# Patient Record
Sex: FEMALE | Race: BLACK OR AFRICAN AMERICAN | NOT HISPANIC OR LATINO | Employment: FULL TIME | URBAN - METROPOLITAN AREA
[De-identification: names, ages, dates, MRNs, and addresses within clinical notes are randomized per-mention and may not be internally consistent; named-entity substitution may affect disease eponyms.]

---
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
## 2019-01-24 ENCOUNTER — HISTORICAL (OUTPATIENT)
Dept: LAB | Facility: HOSPITAL | Age: 19
End: 2019-01-24

## 2019-01-26 LAB — FINAL CULTURE: NORMAL

## 2019-01-29 ENCOUNTER — HISTORICAL (OUTPATIENT)
Dept: LAB | Facility: HOSPITAL | Age: 19
End: 2019-01-29

## 2019-01-29 LAB
ABS NEUT (OLG): 6.69 X10(3)/MCL (ref 2.1–9.2)
BASOPHILS # BLD AUTO: 0 X10(3)/MCL (ref 0–0.2)
BASOPHILS NFR BLD AUTO: 0 %
EOSINOPHIL # BLD AUTO: 0.1 X10(3)/MCL (ref 0–0.9)
EOSINOPHIL NFR BLD AUTO: 1 %
ERYTHROCYTE [DISTWIDTH] IN BLOOD BY AUTOMATED COUNT: 13.1 % (ref 11.5–17)
HCT VFR BLD AUTO: 39.4 % (ref 37–47)
HGB BLD-MCNC: 13.1 GM/DL (ref 12–16)
HIV 1+2 AB+HIV1 P24 AG SERPL QL IA: NEGATIVE
IMM GRANULOCYTES # BLD AUTO: 0.05 % (ref 0–0.02)
IMM GRANULOCYTES NFR BLD AUTO: 0.6 % (ref 0–0.43)
LYMPHOCYTES # BLD AUTO: 1.5 X10(3)/MCL (ref 0.6–4.6)
LYMPHOCYTES NFR BLD AUTO: 17 %
MCH RBC QN AUTO: 30 PG (ref 27–31)
MCHC RBC AUTO-ENTMCNC: 33.2 GM/DL (ref 33–36)
MCV RBC AUTO: 90.2 FL (ref 80–94)
MONOCYTES # BLD AUTO: 0.6 X10(3)/MCL (ref 0.1–1.3)
MONOCYTES NFR BLD AUTO: 7 %
NEUTROPHILS # BLD AUTO: 6.69 X10(3)/MCL (ref 1.4–7.9)
NEUTROPHILS NFR BLD AUTO: 75 %
PLATELET # BLD AUTO: 59 X10(3)/MCL (ref 130–400)
PMV BLD AUTO: ABNORMAL FL (ref 9.4–12.4)
RBC # BLD AUTO: 4.37 X10(6)/MCL (ref 4.2–5.4)
T PALLIDUM AB SER QL: NORMAL
WBC # SPEC AUTO: 8.9 X10(3)/MCL (ref 4.5–11.5)

## 2019-03-14 LAB — POC BETA-HCG (QUAL): NEGATIVE

## 2019-04-02 LAB — POC BETA-HCG (QUAL): NEGATIVE

## 2019-05-01 LAB — POC BETA-HCG (QUAL): NEGATIVE

## 2019-11-07 LAB — POC BETA-HCG (QUAL): NEGATIVE

## 2019-11-21 ENCOUNTER — HISTORICAL (OUTPATIENT)
Dept: RADIOLOGY | Facility: HOSPITAL | Age: 19
End: 2019-11-21

## 2019-12-17 LAB — POC BETA-HCG (QUAL): NEGATIVE

## 2020-01-22 ENCOUNTER — HISTORICAL (OUTPATIENT)
Dept: RADIOLOGY | Facility: HOSPITAL | Age: 20
End: 2020-01-22

## 2020-09-02 LAB — POC BETA-HCG (QUAL): POSITIVE

## 2020-09-09 ENCOUNTER — HISTORICAL (OUTPATIENT)
Dept: LAB | Facility: HOSPITAL | Age: 20
End: 2020-09-09

## 2020-09-09 LAB
ABS NEUT (OLG): 5.83 X10(3)/MCL (ref 2.1–9.2)
AMPHET UR QL SCN: NEGATIVE
BARBITURATE SCN PRESENT UR: NEGATIVE
BASOPHILS # BLD AUTO: 0 X10(3)/MCL (ref 0–0.2)
BASOPHILS NFR BLD AUTO: 1 %
BENZODIAZ UR QL SCN: NEGATIVE
CANNABINOIDS UR QL SCN: NEGATIVE
COCAINE UR QL SCN: NEGATIVE
EOSINOPHIL # BLD AUTO: 0.1 X10(3)/MCL (ref 0–0.9)
EOSINOPHIL NFR BLD AUTO: 1 %
ERYTHROCYTE [DISTWIDTH] IN BLOOD BY AUTOMATED COUNT: 12.9 % (ref 11.5–17)
GROUP & RH: NORMAL
HBV SURFACE AG SERPL QL IA: NONREACTIVE
HCT VFR BLD AUTO: 38.6 % (ref 37–47)
HCV AB SERPL QL IA: NONREACTIVE
HGB BLD-MCNC: 12.8 GM/DL (ref 12–16)
HIV 1+2 AB+HIV1 P24 AG SERPL QL IA: NONREACTIVE
IMM GRANULOCYTES # BLD AUTO: 0.01 % (ref 0–0.02)
IMM GRANULOCYTES NFR BLD AUTO: 0.1 % (ref 0–0.43)
LYMPHOCYTES # BLD AUTO: 2.1 X10(3)/MCL (ref 0.6–4.6)
LYMPHOCYTES NFR BLD AUTO: 24 %
MCH RBC QN AUTO: 29.9 PG (ref 27–31)
MCHC RBC AUTO-ENTMCNC: 33.2 GM/DL (ref 33–36)
MCV RBC AUTO: 90.2 FL (ref 80–94)
MONOCYTES # BLD AUTO: 0.5 X10(3)/MCL (ref 0.1–1.3)
MONOCYTES NFR BLD AUTO: 6 %
NEUTROPHILS # BLD AUTO: 5.83 X10(3)/MCL (ref 1.4–7.9)
NEUTROPHILS NFR BLD AUTO: 68 %
OPIATES UR QL SCN: NEGATIVE
PCP UR QL: NEGATIVE
PH UR STRIP.AUTO: 8.5 [PH] (ref 5–7.5)
PLATELET # BLD AUTO: 133 X10(3)/MCL (ref 130–400)
PMV BLD AUTO: 14.2 FL (ref 9.4–12.4)
RBC # BLD AUTO: 4.28 X10(6)/MCL (ref 4.2–5.4)
SP GR FLD REFRACTOMETRY: 1.01 (ref 1–1.03)
T PALLIDUM AB SER QL: NONREACTIVE
T4 FREE SERPL-MCNC: 0.96 NG/DL (ref 0.76–1.46)
TSH SERPL-ACNC: 0.43 MIU/ML (ref 0.36–3.74)
WBC # SPEC AUTO: 8.6 X10(3)/MCL (ref 4.5–11.5)

## 2021-01-20 ENCOUNTER — HISTORICAL (OUTPATIENT)
Dept: LAB | Facility: HOSPITAL | Age: 21
End: 2021-01-20

## 2021-01-20 LAB — GLUCOSE 1H P 100 G GLC PO SERPL-MCNC: 99 MG/DL (ref 100–180)

## 2021-03-25 ENCOUNTER — HISTORICAL (OUTPATIENT)
Dept: LAB | Facility: HOSPITAL | Age: 21
End: 2021-03-25

## 2021-03-25 LAB
ABS NEUT (OLG): 7.07 X10(3)/MCL (ref 2.1–9.2)
ANISOCYTOSIS BLD QL SMEAR: SLIGHT
BASOPHILS # BLD AUTO: 0.1 X10(3)/MCL (ref 0–0.2)
BASOPHILS NFR BLD AUTO: 1 %
EOSINOPHIL # BLD AUTO: 0.1 X10(3)/MCL (ref 0–0.9)
EOSINOPHIL NFR BLD AUTO: 1 %
ERYTHROCYTE [DISTWIDTH] IN BLOOD BY AUTOMATED COUNT: 14.5 % (ref 11.5–17)
HCT VFR BLD AUTO: 39.4 % (ref 37–47)
HGB BLD-MCNC: 12.8 GM/DL (ref 12–16)
HIV 1+2 AB+HIV1 P24 AG SERPL QL IA: NONREACTIVE
IMM GRANULOCYTES # BLD AUTO: 0.11 % (ref 0–0.02)
IMM GRANULOCYTES NFR BLD AUTO: 1.1 % (ref 0–0.43)
LYMPHOCYTES # BLD AUTO: 1.8 X10(3)/MCL (ref 0.6–4.6)
LYMPHOCYTES NFR BLD AUTO: 18 %
MCH RBC QN AUTO: 29.7 PG (ref 27–31)
MCHC RBC AUTO-ENTMCNC: 32.5 GM/DL (ref 33–36)
MCV RBC AUTO: 91.4 FL (ref 80–94)
MONOCYTES # BLD AUTO: 0.9 X10(3)/MCL (ref 0.1–1.3)
MONOCYTES NFR BLD AUTO: 9 %
NEUTROPHILS # BLD AUTO: 7.07 X10(3)/MCL (ref 1.4–7.9)
NEUTROPHILS NFR BLD AUTO: 70 %
PLATELET # BLD AUTO: 90 X10(3)/MCL (ref 130–400)
PLATELET # BLD EST: NORMAL 10*3/UL
PMV BLD AUTO: 13.7 FL (ref 9.4–12.4)
RBC # BLD AUTO: 4.31 X10(6)/MCL (ref 4.2–5.4)
RBC MORPH BLD: NORMAL
T PALLIDUM AB SER QL: NONREACTIVE
WBC # SPEC AUTO: 10 X10(3)/MCL (ref 4.5–11.5)

## 2021-04-28 LAB — POC BETA-HCG (QUAL): NEGATIVE

## 2021-06-09 LAB — POC BETA-HCG (QUAL): NEGATIVE

## 2021-07-07 LAB — POC BETA-HCG (QUAL): NEGATIVE

## 2022-04-11 ENCOUNTER — HISTORICAL (OUTPATIENT)
Dept: ADMINISTRATIVE | Facility: HOSPITAL | Age: 22
End: 2022-04-11

## 2022-04-28 VITALS
SYSTOLIC BLOOD PRESSURE: 112 MMHG | DIASTOLIC BLOOD PRESSURE: 64 MMHG | BODY MASS INDEX: 35.44 KG/M2 | WEIGHT: 200 LBS | HEIGHT: 63 IN

## 2022-05-04 NOTE — HISTORICAL [facility] CERNER
This is a historical note converted from Naomy. Formatting and pictures may have been removed.  Please reference Naomy for original formatting and attached multimedia. Chief Complaint  referral from [facility] for miscarriage  History of Present Illness  17yo  presents for f/u s/p suction D&C on 3/21 @ [facility]. Pt reports pain well controlled, tolerating regular diet, ambulating and voiding without difficulty, but still has pregnancy symptoms: back aches, nausea, fatigue, urinary frequency. Her bleeding stopped 2 days after procedure at which point she had unprotected intercourse and has lost count of the number of times shes had sex since then.?Only other c/o constipation with last BM before D&C.  Review of Systems  Constitutional: ?Negative, No fever, No chills, No fatigue, No night sweats. ?  Head/Neck: Normocephalic, Atraumatic  Ear/Nose/Mouth/Throat: ?Negative, No nasal congestion, No sore throat. ?  Respiratory: ?Negative, No shortness of breath, No cough, No wheezing. ?  Cardiovascular: ?Negative, No chest pain. ?  Gastrointestinal:?See above.  Genitourinary:??See above. ?  Musculoskeletal: ?Negative, No joint pain, No muscle pain. ?  Integumentary: ?Negative, No rash. ?  Neurologic: ?Negative. ?  Psychiatric: ?Negative.?  Physical Exam  Vitals & Measurements  T:?36.7? ?C (Oral)? RR:?20?  HT:?162.56?cm? HT:?162.56?cm? WT:?69.5?kg? WT:?69.5?kg? BMI:?26.3?  ?  PHYSICAL EXAM  General: NAD, A/Ox3  Neck: supple  Respiratory: nonlabored respirations  Cardiovascular: regular rate  Abdomen: soft, obese, nondistended, nontender to palpation, no hepatosplenomegaly, no masses palpated  Extremities: no edema, no calf tenderness  Breast: defer  External genitalia: normal female anatomy, no masses/lesions. Normal appearing urethral meatus. Normal appearing external anus.  Sterile speculum exam: vaginal mucosa normal in appearance. Pink. No masses/lesions. Cervix well visualized, smooth in contour no  masses or lesions. Os normal in appearance, no blood or discharge coming from the os.  Bimanual exam: vaginal with?adequate capacity. Uterus 6cm in size, no cervical motion tenderness.?Moderate descent, mobile, not broad. No adnexal fullness/tenderness. Suprapubic tenderness. Normal urethra. Normal bladder.  ?  Assessment/Plan  1.?S/P D&C (status post dilation and curettage)  -Doing well postop. Precautions given  ?  Screen for STD (sexually transmitted disease)  -GC/CT, trich negative  -Decline serology  ?  Unprotected sex  -UPT negative. Counselled on contraceptive options including OCPs, vaginal ring,?patch, DepoProvera, Nexplanon, IUD, condoms. Pt verbalizes understanding of her options, and does not want to decide at this time. Encouraged condom use in the meantime  Ordered:  Urine Pregnancy POC, 04/02/18 10:30:00 CDT, [facility]  Urine Pregnancy POC, 04/02/18 10:59:00 CDT, [facility]  ?  Urine frequency  -UA/UCx, tx as indicated  Ordered:  Urine Culture [id number], Routine collect, 04/02/18 11:00:00 CDT, Urine, Clean Catch, Nurse collect, Stop date 04/02/18 11:00:00 CDT, Urine frequency  ?  Constipation  -stool softeners and Miralax prn  ?  Discussed with Dr. Breen.?RTC?1yr or prn if decides wants to initiate contraception  ?   Problem List/Past Medical History  Ongoing  Miscarriage  Historical  No qualifying data  Procedure/Surgical History  D&C - Dilatation and curettage (2018).  Medications  Template Non-Formulary Med, 1 capsule, Oral, Daily  Allergies  No Known Allergies  Social History  Alcohol  Never, 04/02/2018  Employment/School  Employed, Student, 04/02/2018  Exercise  Home/Environment  Lives with Mother. Living situation: Home/Independent., 04/02/2018  Nutrition/Health  Regular, 04/02/2018  Sexual  Sexually active: Yes., 04/02/2018  Substance Abuse  Never, 04/02/2018  Tobacco  Never smoker, 04/02/2018  Family History  Seizure: Brother.      Reviewed the patients medical history, residents  findings on physical exam, and the diagnosis with treatment plan. Care provided was reasonable and necessary.

## 2022-09-21 ENCOUNTER — HISTORICAL (OUTPATIENT)
Dept: ADMINISTRATIVE | Facility: HOSPITAL | Age: 22
End: 2022-09-21

## 2023-04-04 ENCOUNTER — LAB VISIT (OUTPATIENT)
Dept: LAB | Facility: HOSPITAL | Age: 23
End: 2023-04-04
Attending: OBSTETRICS & GYNECOLOGY
Payer: MEDICAID

## 2023-04-04 DIAGNOSIS — Z34.90 PREGNANCY, UNSPECIFIED GESTATIONAL AGE: Primary | ICD-10-CM

## 2023-04-04 LAB
B-HCG FREE SERPL-ACNC: 688.5 MIU/ML
ERYTHROCYTE [DISTWIDTH] IN BLOOD BY AUTOMATED COUNT: 13.1 % (ref 11.5–17)
GROUP & RH: NORMAL
HCT VFR BLD AUTO: 37.9 % (ref 37–47)
HGB BLD-MCNC: 12.2 G/DL (ref 12–16)
INDIRECT COOMBS GEL: NORMAL
MCH RBC QN AUTO: 29.4 PG (ref 27–31)
MCHC RBC AUTO-ENTMCNC: 32.2 G/DL (ref 33–36)
MCV RBC AUTO: 91.3 FL (ref 80–94)
PLATELET # BLD AUTO: 169 X10(3)/MCL (ref 130–400)
PMV BLD AUTO: 13.5 FL (ref 7.4–10.4)
RBC # BLD AUTO: 4.15 X10(6)/MCL (ref 4.2–5.4)
SPECIMEN OUTDATE: NORMAL
T4 FREE SERPL-MCNC: 0.9 NG/DL (ref 0.7–1.48)
TSH SERPL-ACNC: 1.26 UIU/ML (ref 0.35–4.94)
WBC # SPEC AUTO: 9 X10(3)/MCL (ref 4.5–11.5)

## 2023-04-04 PROCEDURE — 84702 CHORIONIC GONADOTROPIN TEST: CPT

## 2023-04-04 PROCEDURE — 85027 COMPLETE CBC AUTOMATED: CPT

## 2023-04-04 PROCEDURE — 84443 ASSAY THYROID STIM HORMONE: CPT

## 2023-04-04 PROCEDURE — 86900 BLOOD TYPING SEROLOGIC ABO: CPT | Performed by: OBSTETRICS & GYNECOLOGY

## 2023-04-04 PROCEDURE — 84439 ASSAY OF FREE THYROXINE: CPT

## 2023-04-04 PROCEDURE — 36415 COLL VENOUS BLD VENIPUNCTURE: CPT

## 2023-04-06 ENCOUNTER — APPOINTMENT (OUTPATIENT)
Dept: LAB | Facility: HOSPITAL | Age: 23
End: 2023-04-06
Attending: OBSTETRICS & GYNECOLOGY
Payer: MEDICAID

## 2023-04-11 ENCOUNTER — LAB VISIT (OUTPATIENT)
Dept: LAB | Facility: HOSPITAL | Age: 23
End: 2023-04-11
Attending: OBSTETRICS & GYNECOLOGY
Payer: MEDICAID

## 2023-04-11 DIAGNOSIS — N91.2 AMENORRHEA: ICD-10-CM

## 2023-04-11 LAB — B-HCG FREE SERPL-ACNC: 9470.73 MIU/ML

## 2023-04-11 PROCEDURE — 84702 CHORIONIC GONADOTROPIN TEST: CPT

## 2023-04-11 PROCEDURE — 36415 COLL VENOUS BLD VENIPUNCTURE: CPT

## 2023-04-13 ENCOUNTER — LAB VISIT (OUTPATIENT)
Dept: LAB | Facility: HOSPITAL | Age: 23
End: 2023-04-13
Attending: OBSTETRICS & GYNECOLOGY
Payer: MEDICAID

## 2023-04-13 DIAGNOSIS — N91.2 AMENORRHEA: ICD-10-CM

## 2023-04-13 LAB — B-HCG FREE SERPL-ACNC: ABNORMAL MIU/ML

## 2023-04-13 PROCEDURE — 84702 CHORIONIC GONADOTROPIN TEST: CPT

## 2023-04-13 PROCEDURE — 36415 COLL VENOUS BLD VENIPUNCTURE: CPT

## 2023-04-14 ENCOUNTER — HOSPITAL ENCOUNTER (OUTPATIENT)
Dept: RADIOLOGY | Facility: HOSPITAL | Age: 23
Discharge: HOME | End: 2023-04-14
Attending: OBSTETRICS & GYNECOLOGY
Payer: MEDICAID

## 2023-04-14 DIAGNOSIS — Z32.01 POSITIVE URINE PREGNANCY TEST: ICD-10-CM

## 2023-04-14 DIAGNOSIS — N91.2 AMENORRHEA: ICD-10-CM

## 2023-04-14 PROCEDURE — 76817 TRANSVAGINAL US OBSTETRIC: CPT | Mod: TC

## 2023-05-04 ENCOUNTER — LAB VISIT (OUTPATIENT)
Dept: LAB | Facility: HOSPITAL | Age: 23
End: 2023-05-04
Payer: MEDICAID

## 2023-05-04 DIAGNOSIS — Z00.00 ROUTINE GENERAL MEDICAL EXAMINATION AT A HEALTH CARE FACILITY: Primary | ICD-10-CM

## 2023-05-04 DIAGNOSIS — Z13.21 SCREENING FOR MALNUTRITION: ICD-10-CM

## 2023-05-04 LAB
ALBUMIN SERPL-MCNC: 3.7 G/DL (ref 3.5–5)
ALBUMIN/GLOB SERPL: 1 RATIO (ref 1.1–2)
ALP SERPL-CCNC: 42 UNIT/L (ref 40–150)
ALT SERPL-CCNC: 12 UNIT/L (ref 0–55)
AST SERPL-CCNC: 13 UNIT/L (ref 5–34)
BILIRUBIN DIRECT+TOT PNL SERPL-MCNC: 0.4 MG/DL
BUN SERPL-MCNC: 6.1 MG/DL (ref 7–18.7)
CALCIUM SERPL-MCNC: 8.8 MG/DL (ref 8.4–10.2)
CHLORIDE SERPL-SCNC: 105 MMOL/L (ref 98–107)
CHOLEST SERPL-MCNC: 177 MG/DL
CHOLEST/HDLC SERPL: 3 {RATIO} (ref 0–5)
CO2 SERPL-SCNC: 24 MMOL/L (ref 22–29)
CREAT SERPL-MCNC: 0.57 MG/DL (ref 0.55–1.02)
DEPRECATED CALCIDIOL+CALCIFEROL SERPL-MC: 21.7 NG/ML (ref 30–80)
GFR SERPLBLD CREATININE-BSD FMLA CKD-EPI: >60 MLS/MIN/1.73/M2
GLOBULIN SER-MCNC: 3.6 GM/DL (ref 2.4–3.5)
GLUCOSE SERPL-MCNC: 92 MG/DL (ref 74–100)
HDLC SERPL-MCNC: 63 MG/DL (ref 35–60)
LDLC SERPL CALC-MCNC: 94 MG/DL (ref 50–140)
POTASSIUM SERPL-SCNC: 3.5 MMOL/L (ref 3.5–5.1)
PROT SERPL-MCNC: 7.3 GM/DL (ref 6.4–8.3)
SODIUM SERPL-SCNC: 137 MMOL/L (ref 136–145)
TRIGL SERPL-MCNC: 98 MG/DL (ref 37–140)
VLDLC SERPL CALC-MCNC: 20 MG/DL

## 2023-05-04 PROCEDURE — 82306 VITAMIN D 25 HYDROXY: CPT

## 2023-05-04 PROCEDURE — 80053 COMPREHEN METABOLIC PANEL: CPT

## 2023-05-04 PROCEDURE — 80061 LIPID PANEL: CPT

## 2023-05-04 PROCEDURE — 36415 COLL VENOUS BLD VENIPUNCTURE: CPT

## 2023-05-31 ENCOUNTER — LAB VISIT (OUTPATIENT)
Dept: LAB | Facility: HOSPITAL | Age: 23
End: 2023-05-31
Attending: OBSTETRICS & GYNECOLOGY
Payer: MEDICAID

## 2023-05-31 DIAGNOSIS — Z34.81 ENCOUNTER FOR SUPERVISION OF NORMAL PREGNANCY IN MULTIGRAVIDA IN FIRST TRIMESTER: ICD-10-CM

## 2023-05-31 DIAGNOSIS — Z03.89 ENCNTR FOR OBS FOR OTH SUSPECTED DISEASES AND COND RULED OUT: ICD-10-CM

## 2023-05-31 LAB
BASOPHILS # BLD AUTO: 0.04 X10(3)/MCL
BASOPHILS NFR BLD AUTO: 0.5 %
EOSINOPHIL # BLD AUTO: 0.11 X10(3)/MCL (ref 0–0.9)
EOSINOPHIL NFR BLD AUTO: 1.3 %
ERYTHROCYTE [DISTWIDTH] IN BLOOD BY AUTOMATED COUNT: 12.4 % (ref 11.5–17)
GROUP & RH: NORMAL
HBV SURFACE AG SERPL QL IA: NONREACTIVE
HCT VFR BLD AUTO: 39.9 % (ref 37–47)
HCV AB SERPL QL IA: NONREACTIVE
HGB BLD-MCNC: 12.7 G/DL (ref 12–16)
HIV 1+2 AB+HIV1 P24 AG SERPL QL IA: NONREACTIVE
IMM GRANULOCYTES # BLD AUTO: 0.03 X10(3)/MCL (ref 0–0.04)
IMM GRANULOCYTES NFR BLD AUTO: 0.3 %
INDIRECT COOMBS GEL: NORMAL
LYMPHOCYTES # BLD AUTO: 2.5 X10(3)/MCL (ref 0.6–4.6)
LYMPHOCYTES NFR BLD AUTO: 29 %
MCH RBC QN AUTO: 28.4 PG (ref 27–31)
MCHC RBC AUTO-ENTMCNC: 31.8 G/DL (ref 33–36)
MCV RBC AUTO: 89.3 FL (ref 80–94)
MONOCYTES # BLD AUTO: 0.57 X10(3)/MCL (ref 0.1–1.3)
MONOCYTES NFR BLD AUTO: 6.6 %
NEUTROPHILS # BLD AUTO: 5.36 X10(3)/MCL (ref 2.1–9.2)
NEUTROPHILS NFR BLD AUTO: 62.3 %
PLATELET # BLD AUTO: 132 X10(3)/MCL (ref 130–400)
PMV BLD AUTO: 13.6 FL (ref 7.4–10.4)
RBC # BLD AUTO: 4.47 X10(6)/MCL (ref 4.2–5.4)
SPECIMEN OUTDATE: NORMAL
T PALLIDUM AB SER QL: NONREACTIVE
T4 FREE SERPL-MCNC: 0.81 NG/DL (ref 0.7–1.48)
TSH SERPL-ACNC: 1.16 UIU/ML (ref 0.35–4.94)
WBC # SPEC AUTO: 8.61 X10(3)/MCL (ref 4.5–11.5)

## 2023-05-31 PROCEDURE — 87340 HEPATITIS B SURFACE AG IA: CPT

## 2023-05-31 PROCEDURE — 36415 COLL VENOUS BLD VENIPUNCTURE: CPT

## 2023-05-31 PROCEDURE — 87389 HIV-1 AG W/HIV-1&-2 AB AG IA: CPT

## 2023-05-31 PROCEDURE — 85025 COMPLETE CBC W/AUTO DIFF WBC: CPT

## 2023-05-31 PROCEDURE — 83020 HEMOGLOBIN ELECTROPHORESIS: CPT

## 2023-05-31 PROCEDURE — 84443 ASSAY THYROID STIM HORMONE: CPT

## 2023-05-31 PROCEDURE — 86762 RUBELLA ANTIBODY: CPT

## 2023-05-31 PROCEDURE — 86780 TREPONEMA PALLIDUM: CPT

## 2023-05-31 PROCEDURE — 86787 VARICELLA-ZOSTER ANTIBODY: CPT

## 2023-05-31 PROCEDURE — 86803 HEPATITIS C AB TEST: CPT

## 2023-05-31 PROCEDURE — 86900 BLOOD TYPING SEROLOGIC ABO: CPT | Performed by: OBSTETRICS & GYNECOLOGY

## 2023-05-31 PROCEDURE — 84439 ASSAY OF FREE THYROXINE: CPT

## 2023-06-01 LAB
HGB A MFR BLD ELPH: 97.6 % (ref 95.8–98)
HGB A2 MFR BLD ELPH: 2.4 % (ref 2–3.3)
HGB F MFR BLD ELPH: 0 % (ref 0–0.9)
HGB FRACT BLD ELPH-IMP: NORMAL
M HPLC HB VARIANT, B: NORMAL
RUBV IGG SERPL IA-ACNC: 2.3
RUBV IGG SERPL QL IA: POSITIVE
VZV IGG SER IA-ACNC: 5.1
VZV IGG SER QL IA: POSITIVE

## 2023-06-13 ENCOUNTER — PATIENT MESSAGE (OUTPATIENT)
Dept: RESEARCH | Facility: HOSPITAL | Age: 23
End: 2023-06-13
Payer: MEDICAID

## 2023-06-23 ENCOUNTER — HOSPITAL ENCOUNTER (EMERGENCY)
Facility: HOSPITAL | Age: 23
Discharge: HOME | End: 2023-06-23
Attending: EMERGENCY MEDICINE
Payer: MEDICAID

## 2023-06-23 VITALS
BODY MASS INDEX: 35.68 KG/M2 | WEIGHT: 209 LBS | HEIGHT: 64 IN | OXYGEN SATURATION: 99 % | RESPIRATION RATE: 18 BRPM | DIASTOLIC BLOOD PRESSURE: 81 MMHG | SYSTOLIC BLOOD PRESSURE: 138 MMHG | HEART RATE: 101 BPM | TEMPERATURE: 98 F

## 2023-06-23 DIAGNOSIS — R52 PAIN: ICD-10-CM

## 2023-06-23 DIAGNOSIS — N39.0 URINARY TRACT INFECTION WITHOUT HEMATURIA, SITE UNSPECIFIED: Primary | ICD-10-CM

## 2023-06-23 LAB
APPEARANCE UR: CLEAR
B-HCG SERPL QL: POSITIVE
BACTERIA #/AREA URNS AUTO: ABNORMAL /HPF
BILIRUB UR QL STRIP.AUTO: NEGATIVE MG/DL
COLOR UR: YELLOW
GLUCOSE UR QL STRIP.AUTO: NEGATIVE MG/DL
KETONES UR QL STRIP.AUTO: 15 MG/DL
LEUKOCYTE ESTERASE UR QL STRIP.AUTO: ABNORMAL UNIT/L
NITRITE UR QL STRIP.AUTO: NEGATIVE
PH UR STRIP.AUTO: 6.5 [PH]
PROT UR QL STRIP.AUTO: 30 MG/DL
RBC #/AREA URNS AUTO: ABNORMAL /HPF
RBC UR QL AUTO: NEGATIVE UNIT/L
SP GR UR STRIP.AUTO: >=1.03
SQUAMOUS #/AREA URNS AUTO: ABNORMAL /HPF
UROBILINOGEN UR STRIP-ACNC: 1 MG/DL
WBC #/AREA URNS AUTO: ABNORMAL /HPF

## 2023-06-23 PROCEDURE — 81025 URINE PREGNANCY TEST: CPT | Performed by: EMERGENCY MEDICINE

## 2023-06-23 PROCEDURE — 81001 URINALYSIS AUTO W/SCOPE: CPT | Performed by: EMERGENCY MEDICINE

## 2023-06-23 PROCEDURE — 99284 EMERGENCY DEPT VISIT MOD MDM: CPT | Mod: 25

## 2023-06-23 RX ORDER — NITROFURANTOIN (MACROCRYSTALS) 100 MG/1
100 CAPSULE ORAL EVERY 12 HOURS
Qty: 20 CAPSULE | Refills: 0 | Status: SHIPPED | OUTPATIENT
Start: 2023-06-23 | End: 2023-07-03

## 2023-06-23 NOTE — Clinical Note
"Zuleika Asifdelia Morales was seen and treated in our emergency department on 6/23/2023.  She may return to work on 06/25/2023.       If you have any questions or concerns, please don't hesitate to call.      Jared Quiroga MD"

## 2023-06-23 NOTE — DISCHARGE INSTRUCTIONS
Patient will be discharged home instructed to take antibiotic as prescribed and finish.  Follow up with OBGYN next week.  Return to ER for any worsening symptoms

## 2023-06-23 NOTE — ED PROVIDER NOTES
Encounter Date: 6/23/2023       History     Chief Complaint   Patient presents with    Abdominal Pain     Pt c/o pelvic pressure x 1 week; reports she is 17 weeks pregnant. Pt denies any vaginal bleeding. Fetal heart tones 149 in triage.      Seventeen week pregnant female with lower abdominal cramping    The history is provided by the patient. No  was used.   Review of patient's allergies indicates:  No Known Allergies  No past medical history on file.  Past Surgical History:   Procedure Laterality Date    DILATION AND CURETTAGE OF UTERUS       Family History   Problem Relation Age of Onset    Seizures Brother      Social History     Tobacco Use    Smoking status: Never    Smokeless tobacco: Never     Review of Systems   Constitutional: Negative.    HENT: Negative.     Eyes: Negative.    Respiratory: Negative.     Cardiovascular: Negative.    Gastrointestinal: Negative.    Endocrine: Negative.    Genitourinary:  Positive for pelvic pain.   Musculoskeletal: Negative.    Skin: Negative.    Allergic/Immunologic: Negative.    Neurological: Negative.    Hematological: Negative.    Psychiatric/Behavioral: Negative.     All other systems reviewed and are negative.    Physical Exam     Initial Vitals [06/23/23 1619]   BP Pulse Resp Temp SpO2   138/81 101 18 98.1 °F (36.7 °C) 99 %      MAP       --         Physical Exam    Nursing note and vitals reviewed.  Constitutional: She appears well-developed and well-nourished.   HENT:   Head: Normocephalic and atraumatic.   Right Ear: External ear normal.   Left Ear: External ear normal.   Nose: Nose normal.   Mouth/Throat: Oropharynx is clear and moist.   Eyes: Conjunctivae and EOM are normal. Pupils are equal, round, and reactive to light.   Neck: Neck supple.   Normal range of motion.  Cardiovascular:  Normal rate, regular rhythm, normal heart sounds and intact distal pulses.           Pulmonary/Chest: Breath sounds normal.   Abdominal: Abdomen is soft. Bowel  sounds are normal.   Genitourinary:    Genitourinary Comments: Pelvic pain     Musculoskeletal:         General: Normal range of motion.      Cervical back: Normal range of motion and neck supple.     Neurological: She is alert and oriented to person, place, and time. She has normal strength and normal reflexes. GCS score is 15. GCS eye subscore is 4. GCS verbal subscore is 5. GCS motor subscore is 6.   Skin: Skin is warm and dry. Capillary refill takes less than 2 seconds.   Psychiatric: She has a normal mood and affect. Her behavior is normal. Judgment and thought content normal.       ED Course   Procedures  Labs Reviewed   URINALYSIS, REFLEX TO URINE CULTURE - Abnormal; Notable for the following components:       Result Value    Protein, UA 30 (*)     Ketones, UA 15 (*)     Leukocyte Esterase, UA Small (*)     All other components within normal limits   PREGNANCY TEST, URINE RAPID - Abnormal; Notable for the following components:    Beta hCG Qualitative, Urine Positive (*)     All other components within normal limits   URINALYSIS, MICROSCOPIC - Abnormal; Notable for the following components:    Bacteria, UA Moderate (*)     Squamous Epithelial Cells, UA Many (*)     All other components within normal limits          Imaging Results              US OB More Than 14 Wks First Gestation (In process)                      Medications - No data to display  Medical Decision Making:   Initial Assessment:   Awake alert oriented no distress 23 year female presents to emergency room complaints of lower pelvic pain.  Patient reports she is 17 weeks pregnant.  Vital signs stable afebrile.  Skin cool moist.  Bilateral breath sounds clear to auscultation respirations even unlabored heart regular rate and rhythm.  Abdomen is soft nontender no rigidity.  No CVAT.  Patient denies vaginal bleeding.  CMT-.  Patient denies abdominal cramping or discharge.  States more of a pressure to lower abdominal area negative flank pain.  All  other review of systems within normal limits.  Differential Diagnosis:   UTI  Ectopic    Clinical Tests:   Lab Tests: Ordered  Radiological Study: Ordered  ED Management:  U/S:  Normal  Labs:  +WBC , Leukocytes                         Clinical Impression:   Final diagnoses:  [R52] Pain  [N39.0] Urinary tract infection without hematuria, site unspecified (Primary)        ED Disposition Condition    Discharge Stable          ED Prescriptions       Medication Sig Dispense Start Date End Date Auth. Provider    nitrofurantoin (MACRODANTIN) 100 MG capsule Take 1 capsule (100 mg total) by mouth every 12 (twelve) hours. for 10 days 20 capsule 6/23/2023 7/3/2023 Kendal River NP          Follow-up Information       Follow up With Specialties Details Why Contact Info    Deana Hoyt NP Nurse Practitioner   [address] [place] [address]  [phone]               Kendal River NP  06/23/23 [id number]

## 2023-07-19 ENCOUNTER — PATIENT MESSAGE (OUTPATIENT)
Dept: RESEARCH | Facility: HOSPITAL | Age: 23
End: 2023-07-19
Payer: MEDICAID

## 2023-07-24 ENCOUNTER — PATIENT MESSAGE (OUTPATIENT)
Dept: RESEARCH | Facility: HOSPITAL | Age: 23
End: 2023-07-24
Payer: MEDICAID

## 2023-09-05 ENCOUNTER — LAB VISIT (OUTPATIENT)
Dept: LAB | Facility: HOSPITAL | Age: 23
End: 2023-09-05
Attending: OBSTETRICS & GYNECOLOGY
Payer: MEDICAID

## 2023-09-05 DIAGNOSIS — Z34.82 ENCOUNTER FOR SUPERVISION OF NORMAL PREGNANCY IN MULTIGRAVIDA IN SECOND TRIMESTER: ICD-10-CM

## 2023-09-05 DIAGNOSIS — Z34.82 PRENATAL CARE, SUBSEQUENT PREGNANCY, SECOND TRIMESTER: ICD-10-CM

## 2023-09-05 LAB
BASOPHILS # BLD AUTO: 0.03 X10(3)/MCL
BASOPHILS NFR BLD AUTO: 0.3 %
EOSINOPHIL # BLD AUTO: 0.07 X10(3)/MCL (ref 0–0.9)
EOSINOPHIL NFR BLD AUTO: 0.8 %
ERYTHROCYTE [DISTWIDTH] IN BLOOD BY AUTOMATED COUNT: 14.6 % (ref 11.5–17)
GLUCOSE 1H P 100 G GLC PO SERPL-MCNC: 108 MG/DL (ref 100–180)
HCT VFR BLD AUTO: 35.7 % (ref 37–47)
HGB BLD-MCNC: 11.4 G/DL (ref 12–16)
IMM GRANULOCYTES # BLD AUTO: 0.11 X10(3)/MCL (ref 0–0.04)
IMM GRANULOCYTES NFR BLD AUTO: 1.3 %
LYMPHOCYTES # BLD AUTO: 2.18 X10(3)/MCL (ref 0.6–4.6)
LYMPHOCYTES NFR BLD AUTO: 25.4 %
MCH RBC QN AUTO: 28.6 PG (ref 27–31)
MCHC RBC AUTO-ENTMCNC: 31.9 G/DL (ref 33–36)
MCV RBC AUTO: 89.5 FL (ref 80–94)
MONOCYTES # BLD AUTO: 0.55 X10(3)/MCL (ref 0.1–1.3)
MONOCYTES NFR BLD AUTO: 6.4 %
NEUTROPHILS # BLD AUTO: 5.65 X10(3)/MCL (ref 2.1–9.2)
NEUTROPHILS NFR BLD AUTO: 65.8 %
PLATELET # BLD AUTO: 103 X10(3)/MCL (ref 130–400)
PLATELET # BLD EST: ABNORMAL 10*3/UL
PMV BLD AUTO: ABNORMAL FL
RBC # BLD AUTO: 3.99 X10(6)/MCL (ref 4.2–5.4)
WBC # SPEC AUTO: 8.59 X10(3)/MCL (ref 4.5–11.5)

## 2023-09-05 PROCEDURE — 85025 COMPLETE CBC W/AUTO DIFF WBC: CPT

## 2023-09-05 PROCEDURE — 36415 COLL VENOUS BLD VENIPUNCTURE: CPT

## 2023-09-05 PROCEDURE — 82950 GLUCOSE TEST: CPT

## 2023-09-06 DIAGNOSIS — D69.6 THROMBOCYTOPENIA: Primary | ICD-10-CM

## 2023-09-11 ENCOUNTER — LAB VISIT (OUTPATIENT)
Dept: LAB | Facility: HOSPITAL | Age: 23
End: 2023-09-11
Attending: OBSTETRICS & GYNECOLOGY
Payer: MEDICAID

## 2023-09-11 DIAGNOSIS — D69.6 THROMBOCYTOPENIA: ICD-10-CM

## 2023-09-11 LAB
BASOPHILS # BLD AUTO: 0.06 X10(3)/MCL
BASOPHILS NFR BLD AUTO: 0.6 %
EOSINOPHIL # BLD AUTO: 0.1 X10(3)/MCL (ref 0–0.9)
EOSINOPHIL NFR BLD AUTO: 1.1 %
ERYTHROCYTE [DISTWIDTH] IN BLOOD BY AUTOMATED COUNT: 14.3 % (ref 11.5–17)
HCT VFR BLD AUTO: 36.8 % (ref 37–47)
HGB BLD-MCNC: 11.7 G/DL (ref 12–16)
IMM GRANULOCYTES # BLD AUTO: 0.17 X10(3)/MCL (ref 0–0.04)
IMM GRANULOCYTES NFR BLD AUTO: 1.8 %
LYMPHOCYTES # BLD AUTO: 2.48 X10(3)/MCL (ref 0.6–4.6)
LYMPHOCYTES NFR BLD AUTO: 26.3 %
MCH RBC QN AUTO: 28.7 PG (ref 27–31)
MCHC RBC AUTO-ENTMCNC: 31.8 G/DL (ref 33–36)
MCV RBC AUTO: 90.2 FL (ref 80–94)
MONOCYTES # BLD AUTO: 0.7 X10(3)/MCL (ref 0.1–1.3)
MONOCYTES NFR BLD AUTO: 7.4 %
NEUTROPHILS # BLD AUTO: 5.91 X10(3)/MCL (ref 2.1–9.2)
NEUTROPHILS NFR BLD AUTO: 62.8 %
PLATELET # BLD AUTO: 102 X10(3)/MCL (ref 130–400)
PMV BLD AUTO: 13.3 FL (ref 7.4–10.4)
RBC # BLD AUTO: 4.08 X10(6)/MCL (ref 4.2–5.4)
WBC # SPEC AUTO: 9.42 X10(3)/MCL (ref 4.5–11.5)

## 2023-09-11 PROCEDURE — 36415 COLL VENOUS BLD VENIPUNCTURE: CPT

## 2023-09-11 PROCEDURE — 85025 COMPLETE CBC W/AUTO DIFF WBC: CPT

## 2023-09-25 ENCOUNTER — OFFICE VISIT (OUTPATIENT)
Dept: MATERNAL FETAL MEDICINE | Facility: CLINIC | Age: 23
End: 2023-09-25
Payer: MEDICAID

## 2023-09-25 ENCOUNTER — PROCEDURE VISIT (OUTPATIENT)
Dept: MATERNAL FETAL MEDICINE | Facility: CLINIC | Age: 23
End: 2023-09-25
Payer: MEDICAID

## 2023-09-25 VITALS
HEART RATE: 96 BPM | SYSTOLIC BLOOD PRESSURE: 112 MMHG | BODY MASS INDEX: 37.22 KG/M2 | WEIGHT: 218 LBS | HEIGHT: 64 IN | DIASTOLIC BLOOD PRESSURE: 80 MMHG

## 2023-09-25 DIAGNOSIS — D69.6 BENIGN GESTATIONAL THROMBOCYTOPENIA IN THIRD TRIMESTER: ICD-10-CM

## 2023-09-25 DIAGNOSIS — E66.01 MORBID OBESITY: ICD-10-CM

## 2023-09-25 DIAGNOSIS — O99.113 BENIGN GESTATIONAL THROMBOCYTOPENIA IN THIRD TRIMESTER: ICD-10-CM

## 2023-09-25 DIAGNOSIS — D69.6 THROMBOCYTOPENIA: ICD-10-CM

## 2023-09-25 DIAGNOSIS — O99.213 OBESITY AFFECTING PREGNANCY IN THIRD TRIMESTER: ICD-10-CM

## 2023-09-25 DIAGNOSIS — Z34.82 PRENATAL CARE, SUBSEQUENT PREGNANCY, SECOND TRIMESTER: ICD-10-CM

## 2023-09-25 PROCEDURE — 3008F PR BODY MASS INDEX (BMI) DOCUMENTED: ICD-10-PCS | Mod: CPTII,S$GLB,, | Performed by: OBSTETRICS & GYNECOLOGY

## 2023-09-25 PROCEDURE — 3074F PR MOST RECENT SYSTOLIC BLOOD PRESSURE < 130 MM HG: ICD-10-PCS | Mod: CPTII,S$GLB,, | Performed by: OBSTETRICS & GYNECOLOGY

## 2023-09-25 PROCEDURE — 1159F PR MEDICATION LIST DOCUMENTED IN MEDICAL RECORD: ICD-10-PCS | Mod: CPTII,S$GLB,, | Performed by: OBSTETRICS & GYNECOLOGY

## 2023-09-25 PROCEDURE — 3008F BODY MASS INDEX DOCD: CPT | Mod: CPTII,S$GLB,, | Performed by: OBSTETRICS & GYNECOLOGY

## 2023-09-25 PROCEDURE — 3074F SYST BP LT 130 MM HG: CPT | Mod: CPTII,S$GLB,, | Performed by: OBSTETRICS & GYNECOLOGY

## 2023-09-25 PROCEDURE — 76811 PR US, OB FETAL EVAL & EXAM, TRANSABDOM,FIRST GESTATION: ICD-10-PCS | Mod: S$GLB,,, | Performed by: OBSTETRICS & GYNECOLOGY

## 2023-09-25 PROCEDURE — 76811 OB US DETAILED SNGL FETUS: CPT | Mod: S$GLB,,, | Performed by: OBSTETRICS & GYNECOLOGY

## 2023-09-25 PROCEDURE — 99203 OFFICE O/P NEW LOW 30 MIN: CPT | Mod: TH,25,S$GLB, | Performed by: OBSTETRICS & GYNECOLOGY

## 2023-09-25 PROCEDURE — 3079F DIAST BP 80-89 MM HG: CPT | Mod: CPTII,S$GLB,, | Performed by: OBSTETRICS & GYNECOLOGY

## 2023-09-25 PROCEDURE — 3079F PR MOST RECENT DIASTOLIC BLOOD PRESSURE 80-89 MM HG: ICD-10-PCS | Mod: CPTII,S$GLB,, | Performed by: OBSTETRICS & GYNECOLOGY

## 2023-09-25 PROCEDURE — 1159F MED LIST DOCD IN RCRD: CPT | Mod: CPTII,S$GLB,, | Performed by: OBSTETRICS & GYNECOLOGY

## 2023-09-25 PROCEDURE — 99203 PR OFFICE/OUTPT VISIT, NEW, LEVL III, 30-44 MIN: ICD-10-PCS | Mod: TH,25,S$GLB, | Performed by: OBSTETRICS & GYNECOLOGY

## 2023-09-25 NOTE — PROGRESS NOTES
Maternal Fetal Medicine New Consult    Subjective     Patient ID: [id number]    Chief Complaint: MFM Consult with US (Thrombocytopenia.  )      HPI 23 y.o.  female  at 29w2d gestation with Estimated Date of Delivery: 23 by early US, not consistent with LMP. She is sent for MFM consultation for thrombocytopenia.  On 2023, platelets 102K.  Earlier platelet was 132 on May 31st 2023. Increased BMI of 37 at consult visit.  Patient has not taken any medication during pregnancy other than prenatal vitamins and Phenergan earlier in pregnancy for nausea vomiting.  She is still using Phenergan p.r.n. about once a week.  She denies any personal or family history of aneuploidy or anomalies. She denies any exposure to high fevers, viral rashes, illicit drugs or alcohol in this pregnancy.  She denies any leaking fluid, vaginal bleeding, contractions, decreased fetal movement. Denies headaches, visual disturbances, or epigastric pain.  Patient is accompanied by Owen Lerner, partner and father with the baby    Pregnancy complications include:   Patient Active Problem List   Diagnosis    Benign gestational thrombocytopenia in third trimester    Increased BMI affecting pregnancy in third trimester        Past Medical History:   Diagnosis Date    Thrombocytopenia, unspecified 2023       Past Surgical History:   Procedure Laterality Date    DILATION AND CURETTAGE OF UTERUS  2018       Family History   Problem Relation Age of Onset    Miscarriages / Stillbirths Maternal Grandmother     Hypertension Maternal Grandmother     Diabetes Maternal Grandmother     Hypertension Maternal Grandfather     Diabetes Maternal Grandfather     Seizures Brother        Social History     Socioeconomic History    Marital status:    Tobacco Use    Smoking status: Never    Smokeless tobacco: Never   Substance and Sexual Activity    Alcohol use: Not Currently    Drug use: Never    Sexual activity: Yes     Partners: Male  "      Current Outpatient Medications   Medication Sig Dispense Refill    prenatal vit/iron fum/folic ac (PRENATAL 1+1 ORAL) Take by mouth.      promethazine (PHENERGAN) 12.5 MG Tab Take 1 tablet (12.5 mg total) by mouth 2 (two) times a day. 60 tablet 1     No current facility-administered medications for this visit.       Review of patient's allergies indicates:  No Known Allergies    Medications:  Current Outpatient Medications   Medication Instructions    prenatal vit/iron fum/folic ac (PRENATAL 1+1 ORAL) Oral    promethazine (PHENERGAN) 12.5 mg, Oral, 2 times daily       Review of Systems   12 point review of systems conducted, negative except as stated in the history of present illness. See HPI for details.      Objective     Visit Vitals  /80 (BP Location: Left arm, Patient Position: Sitting, BP Method: Large (Automatic))   Pulse 96   Ht 5' 4" (1.626 m)   Wt 98.9 kg (218 lb)   LMP 01/01/2023   BMI 37.42 kg/m²        Physical Exam  Vitals and nursing note reviewed. Exam conducted with a chaperone present.   Constitutional:       General: She is not in acute distress.     Appearance: Normal appearance.      Comments: Increased BMI   HENT:      Head: Normocephalic and atraumatic.      Nose: Nose normal. No congestion.      Mouth/Throat:      Pharynx: Oropharynx is clear.   Eyes:      General: No scleral icterus.     Pupils: Pupils are equal, round, and reactive to light.   Cardiovascular:      Rate and Rhythm: Normal rate and regular rhythm.   Pulmonary:      Effort: Pulmonary effort is normal.   Abdominal:      Palpations: Abdomen is soft.      Tenderness: There is no abdominal tenderness. There is no right CVA tenderness, left CVA tenderness or guarding.      Comments: No CVA tenderness gravid uterus.    Musculoskeletal:         General: Normal range of motion.      Cervical back: Neck supple.      Right lower leg: No edema.      Left lower leg: No edema.   Skin:     General: Skin is warm.      Findings: " No bruising or rash.   Neurological:      General: No focal deficit present.      Mental Status: She is oriented to person, place, and time.      Deep Tendon Reflexes: Reflexes normal.      Comments: Normal reflexes   Psychiatric:         Mood and Affect: Mood normal.         Behavior: Behavior normal.         Thought Content: Thought content normal.         Judgment: Judgment normal.         ASSESSMENT/PLAN:     23 y.o.  female with IUP at 29w2d    Gestational thrombocytopenia, 3rd trimester    There is normal fetal growth with an EFW of 1571 g at the 56% and the AC at the 74% on 2023,  with no anomalies seen.  Face views are not possible because of fetal position in a prone position with head in maternal pelvis.  Thrombocytopenia is defined at platelet count of less than 150k. Frequently disorders in platelet function or numbers lead to bleeding in mucosal membranes. With new diagnosis of thrombocytopenia, if drugs and other medical conditions are excluded, the most likely diagnosis in the 1sttrimester is ITP, and in 2nd trimester will be Gestational thrombocytopenia.     I discussed with her that this is a benign condition that affects approximately 10 % of pregnancies, at term, most commonly a result of decrease platelet production. Most commonly see platelet count 100-150 k. About 10% at term have Platelet count less than 150 K. Only 1% have platelet counts less than 100k. Only 0.1% have platelet counts less than 80K. If platelet counts is less than 100k, a cause other than pregnancy or its complications should be considered. Gestational thrombocytopenia can recur in subsequent pregnancies. Women with this are typically asymptomatic and it does not post any significant maternal/fetal risks. However, some may report mild petechiae, ecchymoses, epistaxis, gingival bleeding.     With no higher risk for  morbidity, no treatment is recommended with need to monitor platelet count every 4 weeks  till 36 weeks, then weekly and expectant management to be done. It is expected to see normalization of platelet count 2-12 weeks (mean 7 weeks) post delivery with no sequela.  The platelet count should be repeat 1-3 month pp, to determine if resolution of thrombocytopenia has occurred.  thrombocytopenia occur in 0.1%-2.3%.     If need major surgery and low platelets, it is recommended that platelet transfusion is given to increase platelets to over 50k.     Epidural or spinal anesthesia is considered acceptable and the risk of epidural hematoma is exceptionally low with platelet counts of 70 K or more, provided that the platelet level is stable, there is no other acquired or congenital coagulopathy, the platelet count is normal, and the patient is not on any antiplatelet or anticoagulant therapy.        I gave a test for a CBC to be done around mid October and follow-up CBC could be done with Dr. Stephens.  With fetal anatomy scan incomplete, we will check 1 more time in 4 weeks to try to complete anatomy scan.  Increased BMI in pregnancy    Body mass index is 37.42 kg/m².    I discussed the risk of miscarriage in first trimester, recurrent miscarriages, congenital anomalies, hypertension, diabetes,  labor and the higher risk of  section and the higher risk of fetal demise in-utero. There is also higher risk of for excessive fetal weight and large for gestational age (LGA) fetuses. Mothers with LGA fetuses are at higher risk of prolonged labor,  delivery, shoulder dystocia and birth trauma. LGA neonates are increased risk of fetal hypoxia and intrauterine death, and are at risk to develop diabetes, obesity, metabolic syndrome, asthma and cancer later in life. She was advised of the importance of eating healthy and limiting weight gain to 11-20 lbs during the pregnancy, as optimal in this situation. I recommended low calorie, low fat diet avoiding any additional excessive weight gain.  Excess weight gain would be associated with gestational hypertension, gestational diabetes and adverse  outcomes, including fetal demise in utero.    It is important to do FKC from 24 weeks till delivery.       Importance of working on losing weight after the pregnancy is over, especially before a future pregnancy was discussed. Breastfeeding may be an important tool in reducing the postpartum weight retention. Fetal risks were discussed with short term risk of fetal/ obesity and long term risk of adolescent component of metabolic syndrome.    Follow a healthy low caloric diet.     No follow-ups on file.     Future Appointments   Date Time Provider Department Center   10/2/2023  8:45 AM Kareem Stephens MD [facility]        Patient was evaluated and examined by Dr. Prescott. RIMA Ellis, helped in pre charting of part of note.    Components of this note were documented using voice recognition systems; and are subject to errors not corrected at proof reading.  Please contact the author for any clarifications.

## 2023-10-11 ENCOUNTER — LAB VISIT (OUTPATIENT)
Dept: LAB | Facility: HOSPITAL | Age: 23
End: 2023-10-11
Attending: OBSTETRICS & GYNECOLOGY
Payer: MEDICAID

## 2023-10-11 DIAGNOSIS — D69.6 THROMBOCYTOPENIA: ICD-10-CM

## 2023-10-11 LAB
ERYTHROCYTE [DISTWIDTH] IN BLOOD BY AUTOMATED COUNT: 14.2 % (ref 11.5–17)
HCT VFR BLD AUTO: 37.6 % (ref 37–47)
HGB BLD-MCNC: 11.7 G/DL (ref 12–16)
MCH RBC QN AUTO: 27.9 PG (ref 27–31)
MCHC RBC AUTO-ENTMCNC: 31.1 G/DL (ref 33–36)
MCV RBC AUTO: 89.5 FL (ref 80–94)
PLATELET # BLD AUTO: 113 X10(3)/MCL (ref 130–400)
PMV BLD AUTO: ABNORMAL FL
RBC # BLD AUTO: 4.2 X10(6)/MCL (ref 4.2–5.4)
WBC # SPEC AUTO: 9.51 X10(3)/MCL (ref 4.5–11.5)

## 2023-10-11 PROCEDURE — 85027 COMPLETE CBC AUTOMATED: CPT

## 2023-10-11 PROCEDURE — 36415 COLL VENOUS BLD VENIPUNCTURE: CPT

## 2023-10-17 DIAGNOSIS — O99.213 OBESITY AFFECTING PREGNANCY IN THIRD TRIMESTER: Primary | ICD-10-CM

## 2023-10-23 ENCOUNTER — LAB VISIT (OUTPATIENT)
Dept: LAB | Facility: HOSPITAL | Age: 23
End: 2023-10-23
Attending: OBSTETRICS & GYNECOLOGY
Payer: MEDICAID

## 2023-10-23 ENCOUNTER — TELEPHONE (OUTPATIENT)
Dept: MATERNAL FETAL MEDICINE | Facility: CLINIC | Age: 23
End: 2023-10-23

## 2023-10-23 ENCOUNTER — OFFICE VISIT (OUTPATIENT)
Dept: MATERNAL FETAL MEDICINE | Facility: CLINIC | Age: 23
End: 2023-10-23
Payer: MEDICAID

## 2023-10-23 ENCOUNTER — PROCEDURE VISIT (OUTPATIENT)
Dept: MATERNAL FETAL MEDICINE | Facility: CLINIC | Age: 23
End: 2023-10-23
Payer: MEDICAID

## 2023-10-23 VITALS
BODY MASS INDEX: 37.73 KG/M2 | HEIGHT: 64 IN | DIASTOLIC BLOOD PRESSURE: 90 MMHG | HEART RATE: 105 BPM | SYSTOLIC BLOOD PRESSURE: 121 MMHG | WEIGHT: 221 LBS

## 2023-10-23 DIAGNOSIS — E66.01 MORBID OBESITY: ICD-10-CM

## 2023-10-23 DIAGNOSIS — O99.213 OBESITY AFFECTING PREGNANCY IN THIRD TRIMESTER: ICD-10-CM

## 2023-10-23 DIAGNOSIS — D69.6 BENIGN GESTATIONAL THROMBOCYTOPENIA IN THIRD TRIMESTER: Primary | ICD-10-CM

## 2023-10-23 DIAGNOSIS — D69.6 BENIGN GESTATIONAL THROMBOCYTOPENIA IN THIRD TRIMESTER: ICD-10-CM

## 2023-10-23 DIAGNOSIS — O99.213 OBESITY AFFECTING PREGNANCY IN THIRD TRIMESTER, UNSPECIFIED OBESITY TYPE: ICD-10-CM

## 2023-10-23 DIAGNOSIS — O16.3 ELEVATED BLOOD PRESSURE AFFECTING PREGNANCY IN THIRD TRIMESTER, ANTEPARTUM: ICD-10-CM

## 2023-10-23 DIAGNOSIS — O99.113 BENIGN GESTATIONAL THROMBOCYTOPENIA IN THIRD TRIMESTER: Primary | ICD-10-CM

## 2023-10-23 DIAGNOSIS — O21.0 HYPEREMESIS GRAVIDARUM, ANTEPARTUM: ICD-10-CM

## 2023-10-23 DIAGNOSIS — O99.113 BENIGN GESTATIONAL THROMBOCYTOPENIA IN THIRD TRIMESTER: ICD-10-CM

## 2023-10-23 LAB
ALT SERPL-CCNC: 10 UNIT/L (ref 0–55)
AST SERPL-CCNC: 14 UNIT/L (ref 5–34)
CREAT SERPL-MCNC: 0.58 MG/DL (ref 0.55–1.02)
ERYTHROCYTE [DISTWIDTH] IN BLOOD BY AUTOMATED COUNT: 14.6 % (ref 11.5–17)
GFR SERPLBLD CREATININE-BSD FMLA CKD-EPI: >60 MLS/MIN/1.73/M2
HCT VFR BLD AUTO: 37 % (ref 37–47)
HGB BLD-MCNC: 12 G/DL (ref 12–16)
LDH SERPL-CCNC: 163 U/L (ref 125–220)
MCH RBC QN AUTO: 28.5 PG (ref 27–31)
MCHC RBC AUTO-ENTMCNC: 32.4 G/DL (ref 33–36)
MCV RBC AUTO: 87.9 FL (ref 80–94)
NRBC BLD AUTO-RTO: 0 %
PLATELET # BLD AUTO: 111 X10(3)/MCL (ref 130–400)
PMV BLD AUTO: ABNORMAL FL
RBC # BLD AUTO: 4.21 X10(6)/MCL (ref 4.2–5.4)
URATE SERPL-MCNC: 2.7 MG/DL (ref 2.6–6)
WBC # SPEC AUTO: 9.93 X10(3)/MCL (ref 4.5–11.5)

## 2023-10-23 PROCEDURE — 76816 OB US FOLLOW-UP PER FETUS: CPT | Mod: S$GLB,,, | Performed by: OBSTETRICS & GYNECOLOGY

## 2023-10-23 PROCEDURE — 3008F BODY MASS INDEX DOCD: CPT | Mod: CPTII,S$GLB,, | Performed by: OBSTETRICS & GYNECOLOGY

## 2023-10-23 PROCEDURE — 99214 PR OFFICE/OUTPT VISIT, EST, LEVL IV, 30-39 MIN: ICD-10-PCS | Mod: TH,25,S$GLB, | Performed by: OBSTETRICS & GYNECOLOGY

## 2023-10-23 PROCEDURE — 36415 COLL VENOUS BLD VENIPUNCTURE: CPT

## 2023-10-23 PROCEDURE — 84460 ALANINE AMINO (ALT) (SGPT): CPT

## 2023-10-23 PROCEDURE — 3008F PR BODY MASS INDEX (BMI) DOCUMENTED: ICD-10-PCS | Mod: CPTII,S$GLB,, | Performed by: OBSTETRICS & GYNECOLOGY

## 2023-10-23 PROCEDURE — 82565 ASSAY OF CREATININE: CPT

## 2023-10-23 PROCEDURE — 1159F PR MEDICATION LIST DOCUMENTED IN MEDICAL RECORD: ICD-10-PCS | Mod: CPTII,S$GLB,, | Performed by: OBSTETRICS & GYNECOLOGY

## 2023-10-23 PROCEDURE — 84450 TRANSFERASE (AST) (SGOT): CPT

## 2023-10-23 PROCEDURE — 99214 OFFICE O/P EST MOD 30 MIN: CPT | Mod: TH,25,S$GLB, | Performed by: OBSTETRICS & GYNECOLOGY

## 2023-10-23 PROCEDURE — 3074F SYST BP LT 130 MM HG: CPT | Mod: CPTII,S$GLB,, | Performed by: OBSTETRICS & GYNECOLOGY

## 2023-10-23 PROCEDURE — 76819 PR US, OB, FETAL BIOPHYSICAL, W/O NST: ICD-10-PCS | Mod: S$GLB,,, | Performed by: OBSTETRICS & GYNECOLOGY

## 2023-10-23 PROCEDURE — 3080F DIAST BP >= 90 MM HG: CPT | Mod: CPTII,S$GLB,, | Performed by: OBSTETRICS & GYNECOLOGY

## 2023-10-23 PROCEDURE — 3080F PR MOST RECENT DIASTOLIC BLOOD PRESSURE >= 90 MM HG: ICD-10-PCS | Mod: CPTII,S$GLB,, | Performed by: OBSTETRICS & GYNECOLOGY

## 2023-10-23 PROCEDURE — 1159F MED LIST DOCD IN RCRD: CPT | Mod: CPTII,S$GLB,, | Performed by: OBSTETRICS & GYNECOLOGY

## 2023-10-23 PROCEDURE — 3074F PR MOST RECENT SYSTOLIC BLOOD PRESSURE < 130 MM HG: ICD-10-PCS | Mod: CPTII,S$GLB,, | Performed by: OBSTETRICS & GYNECOLOGY

## 2023-10-23 PROCEDURE — 76816 PR  US,PREGNANT UTERUS,F/U,TRANSABD APP: ICD-10-PCS | Mod: S$GLB,,, | Performed by: OBSTETRICS & GYNECOLOGY

## 2023-10-23 PROCEDURE — 84550 ASSAY OF BLOOD/URIC ACID: CPT

## 2023-10-23 PROCEDURE — 76819 FETAL BIOPHYS PROFIL W/O NST: CPT | Mod: S$GLB,,, | Performed by: OBSTETRICS & GYNECOLOGY

## 2023-10-23 PROCEDURE — 85027 COMPLETE CBC AUTOMATED: CPT

## 2023-10-23 PROCEDURE — 83615 LACTATE (LD) (LDH) ENZYME: CPT

## 2023-10-23 RX ORDER — CEPHALEXIN 250 MG/5ML
250 POWDER, FOR SUSPENSION ORAL EVERY 6 HOURS
COMMUNITY

## 2023-10-23 NOTE — PROGRESS NOTES
Maternal Fetal Medicine Follow Up Consult    Subjective     Patient ID: [id number]    Chief Complaint: MFM follow up with US (Thrombocytopenia. Patient is having headaches, irregular contractions and pelvic pressure.)      HPI: Zuleika Morales is a 23 y.o. female  at 33w2d gestation with Estimated Date of Delivery: 23  who is here for follow  up consultation by MFM.  She has gestational thrombocytopenia.  In May 2023, platelets were 132K. On 2023, platelets 102K. On 10/11/2023, platelets 113K. Increased BMI of 37 at consult visit.  Patient has not taken any medication during pregnancy other than prenatal vitamins and Phenergan earlier in pregnancy for nausea vomiting.           Interval history since last MFM visit:  Intermittent headaches for the last 4 days throbbing with reported history of past migraines.. She denies any leaking fluid, vaginal bleeding, contractions, decreased fetal movement. Denies headaches, visual disturbances, or epigastric pain.    Pregnancy complications include:   Patient Active Problem List   Diagnosis    Benign gestational thrombocytopenia in third trimester    Increased BMI affecting pregnancy in third trimester    BMI at 37 at the initial MFM consult    Hyperemesis gravidarum, antepartum    Elevated blood pressure affecting pregnancy in third trimester, antepartum        No changes to medical, surgical, family, social, or obstetric history.    Medications:  Current Outpatient Medications   Medication Instructions    cephALEXin (KEFLEX) 250 mg, Oral, Every 6 hours, 5 mg daily    prenatal vit/iron fum/folic ac (PRENATAL 1+1 ORAL) Oral    promethazine (PHENERGAN) 12.5 mg, Oral, 2 times daily       Review of Systems   12 point review of systems conducted, negative except as stated in the history of present illness. See HPI for details.      Objective     Visit Vitals  BP (!) 121/90 (BP Location: Left arm, Patient Position: Sitting, BP Method: Large (Automatic))   Pulse 105  "  Ht 5' 4" (1.626 m)   Wt 100.2 kg (221 lb)   LMP 2023   BMI 37.93 kg/m²        Physical Exam  Vitals and nursing note reviewed.   Constitutional:       Appearance: Normal appearance.      Comments: Increased BMI   HENT:      Head: Normocephalic and atraumatic.      Nose: Nose normal. No congestion.   Cardiovascular:      Rate and Rhythm: Normal rate.   Pulmonary:      Effort: Pulmonary effort is normal.   Musculoskeletal:      Comments: Trace edema bilaterally with normal reflexes bilaterally   Skin:     Findings: No rash.   Neurological:      Mental Status: She is alert and oriented to person, place, and time.      Deep Tendon Reflexes: Reflexes normal.   Psychiatric:         Mood and Affect: Mood normal.         Behavior: Behavior normal.         Thought Content: Thought content normal.         Judgment: Judgment normal.           ASSESSMENT/PLAN:     23 y.o.  female with IUP at 33w2d    Gestational thrombocytopenia  There is normal fetal growth with an EFW of 2255 g at the 32% and the AC at the 88% on 10/23/2023.  AFV is normal.     With no higher risk for  morbidity, no treatment is recommended with need to monitor platelet count every 4 weeks till 36 weeks, then weekly and expectant management to be done. It is expected to see normalization of platelet count 2-12 weeks (mean 7 weeks) post delivery with no sequela.  The platelet count should be repeat 1-3 month pp, to determine if resolution of thrombocytopenia has occurred.  thrombocytopenia occur in 0.1%-2.3%.     If need major surgery and low platelets, it is recommended that platelet transfusion is given to increase platelets to over 50k.     Epidural or spinal anesthesia is considered acceptable and the risk of epidural hematoma is exceptionally low with platelet counts of 70 K or more, provided that the platelet level is stable, there is no other acquired or congenital coagulopathy, the platelet count is normal, and the " patient is not on any antiplatelet or anticoagulant therapy.      Increased BMI in pregnancy  Body mass index is 37.93 kg/m². With stable weight from last visit, she was advised to continue healthy and low caloric diet.  Excess weight gain would be associated with gestational hypertension, gestational diabetes and adverse  outcomes, including fetal demise in utero.    With risk factors associated with increased BMI, continue fetal kick counts till delivery.    It is important to lose weight after the pregnancy is over, especially before a future pregnancy was discussed. Breastfeeding may be an important tool in reducing the postpartum weight retention. Fetal risks were discussed with short term risk of fetal/ obesity and long term risk of adolescent component of metabolic syndrome.    Elevated blood pressure today.  Order preeclampsia labs.  If blood pressure is elevated again in the future confirming gestational hypertension that we would like to start seeing her every week with Dr. Stephens.  We will plan to see her again this Thursday to recheck the blood pressure and follow-up on preeclampsia labs that were ordered.    Follow up in about 3 days (around 10/26/2023) for MFM follow-up, BPP.     Future Appointments   Date Time Provider Department Center   10/30/2023  9:00 AM Kareem Stephens MD [facility]        [facility] involvement: Patient was evaluated and examined by Dr. Prescott. RIMA Ellis, helped in pre charting of part of note.      Patient was here to rechecked fetal anatomy scan.  Ductal arch is still not seen.  Limitations of ultrasound discussed.  There is normal fetal growth.  Reassuring fetal testing done because of BMI over 35.  Recommend repeating a platelet count around  and then weekly in the last month of the pregnancy.  We will be happy to see her again if the platelet count get significantly lower than 100,000. We would BMI over 35 consider weekly NSTs  somewhere around 34-36 weeks.  Fetal kick count instructions.  We will check preeclampsia labs in a few days and check blood pressure again.  If gestational hypertension confirmed and twice weekly fetal testing and delivery at 37 weeks unless severe features are present that would require earlier delivery.  Components of this note were documented using voice recognition systems; and are subject to errors not corrected at proof reading.  Please contact the author for any clarifications.

## 2023-10-23 NOTE — PROGRESS NOTES
Platelets 111K, stable from last assessment.  Preeclampsia labs reassuring with , uric acid 2.7, creatinine 0.58, AST 14, ALT 10.  We will discuss at next visit.

## 2023-10-23 NOTE — TELEPHONE ENCOUNTER
----- Message from Sruthi Guzman sent at 10/23/2023  1:57 PM CDT -----  Regarding: Results  Patient is requesting a call back asap at [phone]. She wants to know about her results from the lab work she did today, and if she needs to go to the hospital to be admitted.     Called Pt back to inform her that her labs came out fine per Dr. Prescott and she does not need to be admitted.

## 2023-10-25 DIAGNOSIS — O21.0 HYPEREMESIS GRAVIDARUM, ANTEPARTUM: ICD-10-CM

## 2023-10-25 DIAGNOSIS — E66.01 MORBID OBESITY: ICD-10-CM

## 2023-10-25 DIAGNOSIS — O99.213 OBESITY AFFECTING PREGNANCY IN THIRD TRIMESTER, UNSPECIFIED OBESITY TYPE: ICD-10-CM

## 2023-10-25 DIAGNOSIS — O99.113 BENIGN GESTATIONAL THROMBOCYTOPENIA IN THIRD TRIMESTER: Primary | ICD-10-CM

## 2023-10-25 DIAGNOSIS — D69.6 BENIGN GESTATIONAL THROMBOCYTOPENIA IN THIRD TRIMESTER: Primary | ICD-10-CM

## 2023-10-26 ENCOUNTER — OFFICE VISIT (OUTPATIENT)
Dept: MATERNAL FETAL MEDICINE | Facility: CLINIC | Age: 23
End: 2023-10-26
Payer: MEDICAID

## 2023-10-26 ENCOUNTER — PROCEDURE VISIT (OUTPATIENT)
Dept: MATERNAL FETAL MEDICINE | Facility: CLINIC | Age: 23
End: 2023-10-26
Payer: MEDICAID

## 2023-10-26 VITALS
HEIGHT: 64 IN | HEART RATE: 96 BPM | BODY MASS INDEX: 37.9 KG/M2 | DIASTOLIC BLOOD PRESSURE: 72 MMHG | SYSTOLIC BLOOD PRESSURE: 101 MMHG | WEIGHT: 222 LBS

## 2023-10-26 DIAGNOSIS — O16.3 ELEVATED BLOOD PRESSURE AFFECTING PREGNANCY IN THIRD TRIMESTER, ANTEPARTUM: Primary | ICD-10-CM

## 2023-10-26 DIAGNOSIS — O21.0 HYPEREMESIS GRAVIDARUM, ANTEPARTUM: ICD-10-CM

## 2023-10-26 DIAGNOSIS — O99.213 OBESITY AFFECTING PREGNANCY IN THIRD TRIMESTER, UNSPECIFIED OBESITY TYPE: ICD-10-CM

## 2023-10-26 DIAGNOSIS — E66.01 MORBID OBESITY: ICD-10-CM

## 2023-10-26 DIAGNOSIS — D69.6 BENIGN GESTATIONAL THROMBOCYTOPENIA IN THIRD TRIMESTER: ICD-10-CM

## 2023-10-26 DIAGNOSIS — O99.113 BENIGN GESTATIONAL THROMBOCYTOPENIA IN THIRD TRIMESTER: ICD-10-CM

## 2023-10-26 DIAGNOSIS — R09.82 POSTNASAL DRIP: ICD-10-CM

## 2023-10-26 PROCEDURE — 3008F BODY MASS INDEX DOCD: CPT | Mod: CPTII,S$GLB,, | Performed by: OBSTETRICS & GYNECOLOGY

## 2023-10-26 PROCEDURE — 3008F PR BODY MASS INDEX (BMI) DOCUMENTED: ICD-10-PCS | Mod: CPTII,S$GLB,, | Performed by: OBSTETRICS & GYNECOLOGY

## 2023-10-26 PROCEDURE — 76819 FETAL BIOPHYS PROFIL W/O NST: CPT | Mod: S$GLB,,, | Performed by: OBSTETRICS & GYNECOLOGY

## 2023-10-26 PROCEDURE — 3078F DIAST BP <80 MM HG: CPT | Mod: CPTII,S$GLB,, | Performed by: OBSTETRICS & GYNECOLOGY

## 2023-10-26 PROCEDURE — 99213 PR OFFICE/OUTPT VISIT, EST, LEVL III, 20-29 MIN: ICD-10-PCS | Mod: TH,S$GLB,, | Performed by: OBSTETRICS & GYNECOLOGY

## 2023-10-26 PROCEDURE — 3078F PR MOST RECENT DIASTOLIC BLOOD PRESSURE < 80 MM HG: ICD-10-PCS | Mod: CPTII,S$GLB,, | Performed by: OBSTETRICS & GYNECOLOGY

## 2023-10-26 PROCEDURE — 99213 OFFICE O/P EST LOW 20 MIN: CPT | Mod: TH,S$GLB,, | Performed by: OBSTETRICS & GYNECOLOGY

## 2023-10-26 PROCEDURE — 1159F MED LIST DOCD IN RCRD: CPT | Mod: CPTII,S$GLB,, | Performed by: OBSTETRICS & GYNECOLOGY

## 2023-10-26 PROCEDURE — 1159F PR MEDICATION LIST DOCUMENTED IN MEDICAL RECORD: ICD-10-PCS | Mod: CPTII,S$GLB,, | Performed by: OBSTETRICS & GYNECOLOGY

## 2023-10-26 PROCEDURE — 76819 PR US, OB, FETAL BIOPHYSICAL, W/O NST: ICD-10-PCS | Mod: S$GLB,,, | Performed by: OBSTETRICS & GYNECOLOGY

## 2023-10-26 PROCEDURE — 3074F PR MOST RECENT SYSTOLIC BLOOD PRESSURE < 130 MM HG: ICD-10-PCS | Mod: CPTII,S$GLB,, | Performed by: OBSTETRICS & GYNECOLOGY

## 2023-10-26 PROCEDURE — 3074F SYST BP LT 130 MM HG: CPT | Mod: CPTII,S$GLB,, | Performed by: OBSTETRICS & GYNECOLOGY

## 2023-10-26 NOTE — PROGRESS NOTES
Maternal Fetal Medicine Follow Up Consult    Subjective     Patient ID: [id number]    Chief Complaint: MFM follow up with US (Patient is having headaches, sinus problems and post nasal drip.)      HPI: Zuleika Morales is a 23 y.o. female  at 33w5d gestation with Estimated Date of Delivery: 23  who is here for follow  up consultation by MFM.  She has gestational thrombocytopenia.  In May 2023, platelets were 132K. On 2023, platelets 102K. On 10/11/2023, platelets 113K. Increased BMI of 37 at consult visit.  Patient has not taken any medication during pregnancy other than prenatal vitamins and Phenergan earlier in pregnancy for nausea and vomiting.  She had mildly elevated blood pressure at last visit, for which preeclampsia labs were ordered and were reassuring. On 10/23/2023, platelets 111K, , uric acid 2.7, creatinine 0.58, AST 14, ALT 10.  She complains of a postnasal drip and sinus headache for the past week.  She denies any fever or any other symptoms.  She has been taking Tylenol as needed with some relief.         Interval history since last MFM visit:  Intermittent headaches for the last 4 days throbbing with reported history of past migraines.. She denies any leaking fluid, vaginal bleeding, contractions, decreased fetal movement. Denies headaches, visual disturbances, or epigastric pain.    Pregnancy complications include:   Patient Active Problem List   Diagnosis    Benign gestational thrombocytopenia in third trimester    Increased BMI affecting pregnancy in third trimester    BMI at 37 at the initial MFM consult    Hyperemesis gravidarum, antepartum    Elevated blood pressure affecting pregnancy in third trimester, antepartum    Postnasal drip        No changes to medical, surgical, family, social, or obstetric history.    Medications:  Current Outpatient Medications   Medication Instructions    cephALEXin (KEFLEX) 250 mg, Oral, Every 6 hours, 5 mg daily    prenatal vit/iron  "fum/folic ac (PRENATAL 1+1 ORAL) Oral    promethazine (PHENERGAN) 12.5 mg, Oral, 2 times daily       Review of Systems   12 point review of systems conducted, negative except as stated in the history of present illness. See HPI for details.      Objective     Visit Vitals  /72 (BP Location: Right arm, Patient Position: Sitting, BP Method: Large (Automatic))   Pulse 96   Ht 5' 4" (1.626 m)   Wt 100.7 kg (222 lb)   LMP 2023   BMI 38.11 kg/m²        Physical Exam  Vitals and nursing note reviewed.   Constitutional:       Appearance: Normal appearance.      Comments: Increased BMI   HENT:      Head: Normocephalic and atraumatic.      Nose: Nose normal. No congestion.   Cardiovascular:      Rate and Rhythm: Normal rate.   Pulmonary:      Effort: Pulmonary effort is normal.   Musculoskeletal:      Comments: Trace edema bilaterally with normal reflexes bilaterally   Skin:     Findings: No rash.   Neurological:      Mental Status: She is alert and oriented to person, place, and time.      Deep Tendon Reflexes: Reflexes normal.   Psychiatric:         Mood and Affect: Mood normal.         Behavior: Behavior normal.         Thought Content: Thought content normal.         Judgment: Judgment normal.           ASSESSMENT/PLAN:     23 y.o.  female with IUP at 33w5d    Gestational thrombocytopenia  There is normal fetal growth with an EFW of 2255 g at the 32% and the AC at the 88% on 10/23/2023.  AFV is normal. BPP 8/8.    With no higher risk for  morbidity, no treatment is recommended with need to monitor platelet count every 4 weeks till 36 weeks, then weekly and expectant management to be done. It is expected to see normalization of platelet count 2-12 weeks (mean 7 weeks) post delivery with no sequela.  The platelet count should be repeat 1-3 month pp, to determine if resolution of thrombocytopenia has occurred.  thrombocytopenia occur in 0.1%-2.3%.     If need major surgery and low " platelets, it is recommended that platelet transfusion is given to increase platelets to over 50k.     Epidural or spinal anesthesia is considered acceptable and the risk of epidural hematoma is exceptionally low with platelet counts of 70 K or more, provided that the platelet level is stable, there is no other acquired or congenital coagulopathy, the platelet count is normal, and the patient is not on any antiplatelet or anticoagulant therapy.      Increased BMI in pregnancy  Body mass index is 38.11 kg/m². With stable weight from last visit, she was advised to continue healthy and low caloric diet.  Excess weight gain would be associated with gestational hypertension, gestational diabetes and adverse  outcomes, including fetal demise in utero.    With risk factors associated with increased BMI, continue fetal kick counts till delivery.    It is important to lose weight after the pregnancy is over, especially before a future pregnancy was discussed. Breastfeeding may be an important tool in reducing the postpartum weight retention. Fetal risks were discussed with short term risk of fetal/ obesity and long term risk of adolescent component of metabolic syndrome.    Elevated blood pressure at last visit without diagnosis of hypertension  BP today normal at 101/72.  She is asymptomatic.  Preeclampsia labs on 10/23/2023 were reassuring.  Reviewed preeclampsia precautions.    If blood pressure is elevated again in the future confirming gestational hypertension that we would like to start seeing her every week alongside Dr. Stephens.      Complaint of postnasal drip and sinus headaches  Discussed supportive measures to help with this.  She could continue Tylenol as needed for pain.  Reasonable to take Zyrtec or Claritin as needed.  Instructions given to seek evaluation if she develops fever or condition worsens.    Blood pressure was normal today.  Preeclampsia labs were normal.  Patient has gestational  thrombocytopenia that appears to be stable.  Suggest doing weekly platelet count after 36 weeks.  Consider NSTs at 34-36 weeks because BMI over 35.  I will be happy to see her again if gestational hypertension is confirmed.      Follow up for None. Will see back at primary OB's discretion.     Future Appointments   Date Time Provider Department Center   10/30/2023  9:00 AM Kareem Stephens MD [facility]        Patient was evaluated by RIMA Ellis and Dr. Prescott.  Final assessment and recommendations as stated above were made by Dr. Prescott.     Components of this note were documented using voice recognition systems; and are subject to errors not corrected at proof reading.  Please contact the author for any clarifications.

## 2023-11-07 ENCOUNTER — HOSPITAL ENCOUNTER (EMERGENCY)
Facility: HOSPITAL | Age: 23
Discharge: HOME | End: 2023-11-07
Attending: SPECIALIST
Payer: MEDICAID

## 2023-11-07 VITALS
SYSTOLIC BLOOD PRESSURE: 134 MMHG | OXYGEN SATURATION: 99 % | HEART RATE: 112 BPM | WEIGHT: 223 LBS | DIASTOLIC BLOOD PRESSURE: 66 MMHG | BODY MASS INDEX: 38.28 KG/M2 | RESPIRATION RATE: 18 BRPM | TEMPERATURE: 98 F

## 2023-11-07 PROCEDURE — 99284 EMERGENCY DEPT VISIT MOD MDM: CPT

## 2023-11-07 NOTE — ED PROVIDER NOTES
[facility] NOTE     Admit Date: 2023  [facility] Physician: Kareem Rizzo  Primary OBGYN: Dr. Kareem Stephens    Admit Diagnosis/Chief Complaint:       Chief Complaint   Patient presents with    Abdominal Pain    Back Pain     Iup at 35.3 with c/o abd pain, back pain and lost mucus plug. Also reporting decreased fetal movement this morning. Ob is lolly stephens    Decreased Fetal Movement       Hospital Course:  Zuleika Morales is a 23 y.o.  at 35w3d presents complaining of decreased fetal movement this morning.  This has now resolved.  Patient complains of low back pain and abdominal and hip pain since last night.  She also reports passing some mucus last night.  No evidence of actual ruptured membranes.    This IUP is complicated by no reported problems.    Patient denies vaginal bleeding, leakage of fluid, contractions, headache, vision changes, RUQ pain, dysuria, fever, and nausea/vomiting.  Fetal Movement: normal.      /81   Pulse (!) 120   Temp 97.6 °F (36.4 °C) (Oral)   Resp 18   Wt 101.2 kg (223 lb)   LMP 2023   SpO2 98%   Breastfeeding No   BMI 38.28 kg/m²   Temp:  [97.6 °F (36.4 °C)] 97.6 °F (36.4 °C)  Pulse:  [120] 120  Resp:  [18] 18  SpO2:  [98 %] 98 %  BP: (136)/(81) 136/81    General: in no apparent distress  Cardiovascular: regular rate and rhythm no murmurs not examined  Respiratory: clear to auscultation, no wheezes, rales or rhonchi, symmetric air entry not examined  Abdominal: fundus soft, nontender except for suprapubic area.  Thirty-six weeks size  Back: lumbar tenderness present CVA tenderness none  Extremeties no redness or tenderness in the calves or thighs no edema    SSE:   SVE:  External os 1-2, internal closed, thick and posterior      FHT: Category 1 with good reactivity  TOCO:  Occasional contraction noted.    Medical Decision Making:  Patient is now feeling the infant move.  Some of her abdominal pain is consistent with fetal movements.  Third trimester pregnancy  discomforts are discussed along with some treatment options.    LABS:   No results found for this or any previous visit (from the past 24 hour(s)).  [unfilled]     Imaging Results    None          ASSESMENT: Zuleika Morales is a 23 y.o.   at 35w3d with decreased fetal movement resolved.  Abdominal and back pain of 3rd trimester pregnancy.    Discharge Diagnosis/Clinical Impression:  Pregnancy 35 weeks.  Decreased fetal movement.  Abdominal and back pain of 3rd trimester pregnancy.    Status:Improved/stable    Disposition:  discharged to home    Medications:   Tylenol for discomfort    Patient Instructions:   Current Discharge Medication List        CONTINUE these medications which have NOT CHANGED    Details   prenatal vit/iron fum/folic ac (PRENATAL 1+1 ORAL) Take by mouth.      promethazine (PHENERGAN) 12.5 MG Tab Take 1 tablet (12.5 mg total) by mouth 2 (two) times a day.  Qty: 60 tablet, Refills: 1    Associated Diagnoses: Encounter for supervision of normal pregnancy in multigravida in first trimester      cephALEXin (KEFLEX) 250 mg/5 mL suspension Take 250 mg by mouth every 6 (six) hours. 5 mg daily             - Pt was given routine pregnancy instructions including to return to triage if she had any vaginal bleeding (other than spotting for the next 48hrs), any loss of fluid like her water broke, decreased fetal movement, or contractions Q 5min lasting for 2 or more hours. Pt was also instructed to drink copious water. Patient voiced understanding of all these instructions and was subsequently discharged home.    She will follow up with her primary OB.    No discharge procedures on file.    Kareem Rizzo MD  OB-GYN Hospitalist       Kareem Rizzo MD  [phone]

## 2023-11-20 ENCOUNTER — ANESTHESIA (OUTPATIENT)
Dept: OBSTETRICS AND GYNECOLOGY | Facility: HOSPITAL | Age: 23
End: 2023-11-20
Payer: MEDICAID

## 2023-11-20 ENCOUNTER — ANESTHESIA EVENT (OUTPATIENT)
Dept: OBSTETRICS AND GYNECOLOGY | Facility: HOSPITAL | Age: 23
End: 2023-11-20
Payer: MEDICAID

## 2023-11-20 ENCOUNTER — HOSPITAL ENCOUNTER (INPATIENT)
Facility: HOSPITAL | Age: 23
LOS: 2 days | Discharge: HOME | End: 2023-11-22
Attending: OBSTETRICS & GYNECOLOGY | Admitting: OBSTETRICS & GYNECOLOGY
Payer: MEDICAID

## 2023-11-20 DIAGNOSIS — O99.113 BENIGN GESTATIONAL THROMBOCYTOPENIA IN THIRD TRIMESTER: ICD-10-CM

## 2023-11-20 DIAGNOSIS — D69.6 BENIGN GESTATIONAL THROMBOCYTOPENIA IN THIRD TRIMESTER: ICD-10-CM

## 2023-11-20 DIAGNOSIS — O41.00X0 OLIGOHYDRAMNIOS: Primary | ICD-10-CM

## 2023-11-20 LAB
ABS NEUT (OLG): 6.94 X10(3)/MCL (ref 2.1–9.2)
ALBUMIN SERPL-MCNC: 3.1 G/DL (ref 3.5–5)
ALBUMIN/GLOB SERPL: 0.9 RATIO (ref 1.1–2)
ALP SERPL-CCNC: 188 UNIT/L (ref 40–150)
ALT SERPL-CCNC: 12 UNIT/L (ref 0–55)
APTT PPP: 30.8 SECONDS (ref 23.2–33.7)
AST SERPL-CCNC: 19 UNIT/L (ref 5–34)
BILIRUB SERPL-MCNC: 0.3 MG/DL
BUN SERPL-MCNC: 3.7 MG/DL (ref 7–18.7)
CALCIUM SERPL-MCNC: 8.5 MG/DL (ref 8.4–10.2)
CHLORIDE SERPL-SCNC: 106 MMOL/L (ref 98–107)
CO2 SERPL-SCNC: 20 MMOL/L (ref 22–29)
CREAT SERPL-MCNC: 0.54 MG/DL (ref 0.55–1.02)
ERYTHROCYTE [DISTWIDTH] IN BLOOD BY AUTOMATED COUNT: 14.3 % (ref 11.5–17)
GFR SERPLBLD CREATININE-BSD FMLA CKD-EPI: >60 MLS/MIN/1.73/M2
GLOBULIN SER-MCNC: 3.6 GM/DL (ref 2.4–3.5)
GLUCOSE SERPL-MCNC: 90 MG/DL (ref 74–100)
GROUP & RH: NORMAL
HCT VFR BLD AUTO: 38.2 % (ref 37–47)
HGB BLD-MCNC: 12.5 G/DL (ref 12–16)
INDIRECT COOMBS GEL: NORMAL
INSTRUMENT WBC (OLG): 9.78 X10(3)/MCL
LDH SERPL-CCNC: 213 U/L (ref 125–220)
LYMPHOCYTES NFR BLD MANUAL: 2.74 X10(3)/MCL
LYMPHOCYTES NFR BLD MANUAL: 28 %
MCH RBC QN AUTO: 27.6 PG (ref 27–31)
MCHC RBC AUTO-ENTMCNC: 32.7 G/DL (ref 33–36)
MCV RBC AUTO: 84.3 FL (ref 80–94)
MONOCYTES NFR BLD MANUAL: 0.1 X10(3)/MCL (ref 0.1–1.3)
MONOCYTES NFR BLD MANUAL: 1 %
NEUTROPHILS NFR BLD MANUAL: 71 %
NRBC BLD AUTO-RTO: 0 %
PLATELET # BLD AUTO: 122 X10(3)/MCL (ref 130–400)
PLATELET # BLD EST: ABNORMAL 10*3/UL
PLATELETS.RETICULATED NFR BLD AUTO: 31.6 % (ref 0.9–11.2)
PMV BLD AUTO: ABNORMAL FL
POTASSIUM SERPL-SCNC: 3.8 MMOL/L (ref 3.5–5.1)
PROT SERPL-MCNC: 6.7 GM/DL (ref 6.4–8.3)
RBC # BLD AUTO: 4.53 X10(6)/MCL (ref 4.2–5.4)
RBC MORPH BLD: NORMAL
SODIUM SERPL-SCNC: 138 MMOL/L (ref 136–145)
SPECIMEN OUTDATE: NORMAL
T PALLIDUM AB SER QL: NONREACTIVE
WBC # SPEC AUTO: 9.78 X10(3)/MCL (ref 4.5–11.5)

## 2023-11-20 PROCEDURE — 80053 COMPREHEN METABOLIC PANEL: CPT | Performed by: OBSTETRICS & GYNECOLOGY

## 2023-11-20 PROCEDURE — 51702 INSERT TEMP BLADDER CATH: CPT

## 2023-11-20 PROCEDURE — 63600175 PHARM REV CODE 636 W HCPCS: Performed by: OBSTETRICS & GYNECOLOGY

## 2023-11-20 PROCEDURE — 85730 THROMBOPLASTIN TIME PARTIAL: CPT | Performed by: OBSTETRICS & GYNECOLOGY

## 2023-11-20 PROCEDURE — 99900035 HC TECH TIME PER 15 MIN (STAT)

## 2023-11-20 PROCEDURE — 59409 OBSTETRICAL CARE: CPT | Mod: AA,,, | Performed by: ANESTHESIOLOGY

## 2023-11-20 PROCEDURE — 59409 PRA ETRICAL CARE,VAG DELIV ONLY: ICD-10-PCS | Mod: AA,,, | Performed by: ANESTHESIOLOGY

## 2023-11-20 PROCEDURE — 86780 TREPONEMA PALLIDUM: CPT | Performed by: OBSTETRICS & GYNECOLOGY

## 2023-11-20 PROCEDURE — 72100002 HC LABOR CARE, 1ST 8 HOURS

## 2023-11-20 PROCEDURE — 94761 N-INVAS EAR/PLS OXIMETRY MLT: CPT

## 2023-11-20 PROCEDURE — 62326 NJX INTERLAMINAR LMBR/SAC: CPT | Performed by: ANESTHESIOLOGY

## 2023-11-20 PROCEDURE — 85027 COMPLETE CBC AUTOMATED: CPT | Performed by: OBSTETRICS & GYNECOLOGY

## 2023-11-20 PROCEDURE — 83615 LACTATE (LD) (LDH) ENZYME: CPT | Performed by: OBSTETRICS & GYNECOLOGY

## 2023-11-20 PROCEDURE — 25000003 PHARM REV CODE 250: Performed by: OBSTETRICS & GYNECOLOGY

## 2023-11-20 PROCEDURE — 72200005 HC VAGINAL DELIVERY LEVEL II

## 2023-11-20 PROCEDURE — 25000003 PHARM REV CODE 250: Performed by: ANESTHESIOLOGY

## 2023-11-20 PROCEDURE — 86901 BLOOD TYPING SEROLOGIC RH(D): CPT | Performed by: OBSTETRICS & GYNECOLOGY

## 2023-11-20 PROCEDURE — 11000001 HC ACUTE MED/SURG PRIVATE ROOM

## 2023-11-20 PROCEDURE — 63600175 PHARM REV CODE 636 W HCPCS: Performed by: ANESTHESIOLOGY

## 2023-11-20 RX ORDER — NALBUPHINE HYDROCHLORIDE 10 MG/ML
2.5 INJECTION, SOLUTION INTRAMUSCULAR; INTRAVENOUS; SUBCUTANEOUS ONCE
Status: DISCONTINUED | OUTPATIENT
Start: 2023-11-20 | End: 2023-11-22 | Stop reason: HOSPADM

## 2023-11-20 RX ORDER — SIMETHICONE 80 MG
1 TABLET,CHEWABLE ORAL 4 TIMES DAILY PRN
Status: DISCONTINUED | OUTPATIENT
Start: 2023-11-20 | End: 2023-11-20

## 2023-11-20 RX ORDER — PROCHLORPERAZINE EDISYLATE 5 MG/ML
5 INJECTION INTRAMUSCULAR; INTRAVENOUS EVERY 6 HOURS PRN
Status: DISCONTINUED | OUTPATIENT
Start: 2023-11-20 | End: 2023-11-20

## 2023-11-20 RX ORDER — METHYLERGONOVINE MALEATE 0.2 MG/ML
200 INJECTION INTRAVENOUS ONCE AS NEEDED
Status: DISCONTINUED | OUTPATIENT
Start: 2023-11-20 | End: 2023-11-22 | Stop reason: HOSPADM

## 2023-11-20 RX ORDER — HYDROCORTISONE 25 MG/G
CREAM TOPICAL 3 TIMES DAILY PRN
Status: DISCONTINUED | OUTPATIENT
Start: 2023-11-20 | End: 2023-11-22 | Stop reason: HOSPADM

## 2023-11-20 RX ORDER — MUPIROCIN 20 MG/G
OINTMENT TOPICAL
Status: DISCONTINUED | OUTPATIENT
Start: 2023-11-20 | End: 2023-11-22 | Stop reason: HOSPADM

## 2023-11-20 RX ORDER — OXYTOCIN 10 [USP'U]/ML
10 INJECTION, SOLUTION INTRAMUSCULAR; INTRAVENOUS ONCE AS NEEDED
Status: DISCONTINUED | OUTPATIENT
Start: 2023-11-20 | End: 2023-11-20

## 2023-11-20 RX ORDER — METHYLERGONOVINE MALEATE 0.2 MG/ML
200 INJECTION INTRAVENOUS ONCE AS NEEDED
Status: DISCONTINUED | OUTPATIENT
Start: 2023-11-20 | End: 2023-11-20

## 2023-11-20 RX ORDER — LIDOCAINE HYDROCHLORIDE 10 MG/ML
10 INJECTION INFILTRATION; PERINEURAL ONCE AS NEEDED
Status: DISCONTINUED | OUTPATIENT
Start: 2023-11-20 | End: 2023-11-22 | Stop reason: HOSPADM

## 2023-11-20 RX ORDER — CARBOPROST TROMETHAMINE 250 UG/ML
250 INJECTION, SOLUTION INTRAMUSCULAR
Status: DISCONTINUED | OUTPATIENT
Start: 2023-11-20 | End: 2023-11-20

## 2023-11-20 RX ORDER — OXYTOCIN/RINGER'S LACTATE 30/500 ML
334 PLASTIC BAG, INJECTION (ML) INTRAVENOUS ONCE AS NEEDED
Status: DISCONTINUED | OUTPATIENT
Start: 2023-11-20 | End: 2023-11-22 | Stop reason: HOSPADM

## 2023-11-20 RX ORDER — OXYTOCIN/RINGER'S LACTATE 30/500 ML
95 PLASTIC BAG, INJECTION (ML) INTRAVENOUS ONCE
Status: DISCONTINUED | OUTPATIENT
Start: 2023-11-20 | End: 2023-11-22 | Stop reason: HOSPADM

## 2023-11-20 RX ORDER — OXYCODONE HYDROCHLORIDE 5 MG/1
10 TABLET ORAL EVERY 6 HOURS PRN
Status: DISCONTINUED | OUTPATIENT
Start: 2023-11-20 | End: 2023-11-22 | Stop reason: HOSPADM

## 2023-11-20 RX ORDER — DIPHENOXYLATE HYDROCHLORIDE AND ATROPINE SULFATE 2.5; .025 MG/1; MG/1
2 TABLET ORAL EVERY 6 HOURS PRN
Status: DISCONTINUED | OUTPATIENT
Start: 2023-11-20 | End: 2023-11-20

## 2023-11-20 RX ORDER — OXYCODONE AND ACETAMINOPHEN 5; 325 MG/1; MG/1
1 TABLET ORAL EVERY 4 HOURS PRN
Status: DISCONTINUED | OUTPATIENT
Start: 2023-11-20 | End: 2023-11-22 | Stop reason: HOSPADM

## 2023-11-20 RX ORDER — MISOPROSTOL 100 UG/1
800 TABLET ORAL ONCE AS NEEDED
Status: DISCONTINUED | OUTPATIENT
Start: 2023-11-20 | End: 2023-11-22 | Stop reason: HOSPADM

## 2023-11-20 RX ORDER — ONDANSETRON 4 MG/1
8 TABLET, ORALLY DISINTEGRATING ORAL EVERY 8 HOURS PRN
Status: DISCONTINUED | OUTPATIENT
Start: 2023-11-20 | End: 2023-11-22 | Stop reason: HOSPADM

## 2023-11-20 RX ORDER — OXYTOCIN/RINGER'S LACTATE 30/500 ML
334 PLASTIC BAG, INJECTION (ML) INTRAVENOUS ONCE
Status: DISCONTINUED | OUTPATIENT
Start: 2023-11-20 | End: 2023-11-22 | Stop reason: HOSPADM

## 2023-11-20 RX ORDER — SODIUM CITRATE AND CITRIC ACID MONOHYDRATE 334; 500 MG/5ML; MG/5ML
30 SOLUTION ORAL ONCE
Status: COMPLETED | OUTPATIENT
Start: 2023-11-20 | End: 2023-11-20

## 2023-11-20 RX ORDER — DOCUSATE SODIUM 100 MG/1
200 CAPSULE, LIQUID FILLED ORAL 2 TIMES DAILY PRN
Status: DISCONTINUED | OUTPATIENT
Start: 2023-11-20 | End: 2023-11-22 | Stop reason: HOSPADM

## 2023-11-20 RX ORDER — EPHEDRINE SULFATE 50 MG/ML
10 INJECTION, SOLUTION INTRAVENOUS ONCE AS NEEDED
Status: DISCONTINUED | OUTPATIENT
Start: 2023-11-20 | End: 2023-11-22 | Stop reason: HOSPADM

## 2023-11-20 RX ORDER — FENTANYL/BUPIVACAINE/NS/PF 2-1250MCG
PLASTIC BAG, INJECTION (ML) INJECTION CONTINUOUS
Status: DISCONTINUED | OUTPATIENT
Start: 2023-11-20 | End: 2023-11-22 | Stop reason: HOSPADM

## 2023-11-20 RX ORDER — OXYTOCIN/RINGER'S LACTATE 30/500 ML
0-30 PLASTIC BAG, INJECTION (ML) INTRAVENOUS CONTINUOUS
Status: DISCONTINUED | OUTPATIENT
Start: 2023-11-20 | End: 2023-11-22 | Stop reason: HOSPADM

## 2023-11-20 RX ORDER — DIPHENHYDRAMINE HCL 25 MG
25 CAPSULE ORAL EVERY 4 HOURS PRN
Status: DISCONTINUED | OUTPATIENT
Start: 2023-11-20 | End: 2023-11-22 | Stop reason: HOSPADM

## 2023-11-20 RX ORDER — PROCHLORPERAZINE EDISYLATE 5 MG/ML
5 INJECTION INTRAMUSCULAR; INTRAVENOUS EVERY 6 HOURS PRN
Status: DISCONTINUED | OUTPATIENT
Start: 2023-11-20 | End: 2023-11-22 | Stop reason: HOSPADM

## 2023-11-20 RX ORDER — CALCIUM CARBONATE 200(500)MG
500 TABLET,CHEWABLE ORAL 3 TIMES DAILY PRN
Status: DISCONTINUED | OUTPATIENT
Start: 2023-11-20 | End: 2023-11-22 | Stop reason: HOSPADM

## 2023-11-20 RX ORDER — SODIUM CHLORIDE, SODIUM LACTATE, POTASSIUM CHLORIDE, CALCIUM CHLORIDE 600; 310; 30; 20 MG/100ML; MG/100ML; MG/100ML; MG/100ML
INJECTION, SOLUTION INTRAVENOUS CONTINUOUS
Status: DISCONTINUED | OUTPATIENT
Start: 2023-11-20 | End: 2023-11-22 | Stop reason: HOSPADM

## 2023-11-20 RX ORDER — OXYTOCIN 10 [USP'U]/ML
10 INJECTION, SOLUTION INTRAMUSCULAR; INTRAVENOUS ONCE AS NEEDED
Status: DISCONTINUED | OUTPATIENT
Start: 2023-11-20 | End: 2023-11-22 | Stop reason: HOSPADM

## 2023-11-20 RX ORDER — DEXTROSE, SODIUM CHLORIDE, SODIUM LACTATE, POTASSIUM CHLORIDE, AND CALCIUM CHLORIDE 5; .6; .31; .03; .02 G/100ML; G/100ML; G/100ML; G/100ML; G/100ML
INJECTION, SOLUTION INTRAVENOUS CONTINUOUS
Status: DISCONTINUED | OUTPATIENT
Start: 2023-11-20 | End: 2023-11-20

## 2023-11-20 RX ORDER — PRENATAL WITH FERROUS FUM AND FOLIC ACID 3080; 920; 120; 400; 22; 1.84; 3; 20; 10; 1; 12; 200; 27; 25; 2 [IU]/1; [IU]/1; MG/1; [IU]/1; MG/1; MG/1; MG/1; MG/1; MG/1; MG/1; UG/1; MG/1; MG/1; MG/1; MG/1
1 TABLET ORAL DAILY
Status: DISCONTINUED | OUTPATIENT
Start: 2023-11-21 | End: 2023-11-22 | Stop reason: HOSPADM

## 2023-11-20 RX ORDER — OXYTOCIN/RINGER'S LACTATE 30/500 ML
95 PLASTIC BAG, INJECTION (ML) INTRAVENOUS ONCE AS NEEDED
Status: COMPLETED | OUTPATIENT
Start: 2023-11-20 | End: 2023-11-20

## 2023-11-20 RX ORDER — SIMETHICONE 80 MG
1 TABLET,CHEWABLE ORAL EVERY 6 HOURS PRN
Status: DISCONTINUED | OUTPATIENT
Start: 2023-11-20 | End: 2023-11-22 | Stop reason: HOSPADM

## 2023-11-20 RX ORDER — ACETAMINOPHEN 325 MG/1
650 TABLET ORAL EVERY 6 HOURS PRN
Status: DISCONTINUED | OUTPATIENT
Start: 2023-11-20 | End: 2023-11-22 | Stop reason: HOSPADM

## 2023-11-20 RX ORDER — SODIUM CHLORIDE 0.9 % (FLUSH) 0.9 %
10 SYRINGE (ML) INJECTION
Status: DISCONTINUED | OUTPATIENT
Start: 2023-11-20 | End: 2023-11-22 | Stop reason: HOSPADM

## 2023-11-20 RX ORDER — DIPHENHYDRAMINE HYDROCHLORIDE 50 MG/ML
25 INJECTION INTRAMUSCULAR; INTRAVENOUS EVERY 4 HOURS PRN
Status: DISCONTINUED | OUTPATIENT
Start: 2023-11-20 | End: 2023-11-22 | Stop reason: HOSPADM

## 2023-11-20 RX ORDER — BUPIVACAINE HYDROCHLORIDE 2.5 MG/ML
INJECTION, SOLUTION EPIDURAL; INFILTRATION; INTRACAUDAL
Status: COMPLETED | OUTPATIENT
Start: 2023-11-20 | End: 2023-11-20

## 2023-11-20 RX ORDER — OXYTOCIN/RINGER'S LACTATE 30/500 ML
95 PLASTIC BAG, INJECTION (ML) INTRAVENOUS ONCE
Status: COMPLETED | OUTPATIENT
Start: 2023-11-20 | End: 2023-11-20

## 2023-11-20 RX ORDER — DIPHENOXYLATE HYDROCHLORIDE AND ATROPINE SULFATE 2.5; .025 MG/1; MG/1
2 TABLET ORAL EVERY 6 HOURS PRN
Status: DISCONTINUED | OUTPATIENT
Start: 2023-11-20 | End: 2023-11-22 | Stop reason: HOSPADM

## 2023-11-20 RX ORDER — FAMOTIDINE 10 MG/ML
20 INJECTION INTRAVENOUS ONCE
Status: DISCONTINUED | OUTPATIENT
Start: 2023-11-20 | End: 2023-11-22 | Stop reason: HOSPADM

## 2023-11-20 RX ORDER — MISOPROSTOL 100 UG/1
800 TABLET ORAL ONCE AS NEEDED
Status: COMPLETED | OUTPATIENT
Start: 2023-11-20 | End: 2023-11-20

## 2023-11-20 RX ORDER — IBUPROFEN 600 MG/1
600 TABLET ORAL EVERY 6 HOURS PRN
Status: DISCONTINUED | OUTPATIENT
Start: 2023-11-20 | End: 2023-11-22 | Stop reason: HOSPADM

## 2023-11-20 RX ORDER — OXYTOCIN/RINGER'S LACTATE 30/500 ML
334 PLASTIC BAG, INJECTION (ML) INTRAVENOUS ONCE AS NEEDED
Status: COMPLETED | OUTPATIENT
Start: 2023-11-20 | End: 2023-11-20

## 2023-11-20 RX ORDER — EPHEDRINE SULFATE 50 MG/ML
INJECTION, SOLUTION INTRAVENOUS
Status: DISCONTINUED
Start: 2023-11-20 | End: 2023-11-20 | Stop reason: WASHOUT

## 2023-11-20 RX ORDER — OXYTOCIN/RINGER'S LACTATE 30/500 ML
95 PLASTIC BAG, INJECTION (ML) INTRAVENOUS ONCE AS NEEDED
Status: DISCONTINUED | OUTPATIENT
Start: 2023-11-20 | End: 2023-11-22 | Stop reason: HOSPADM

## 2023-11-20 RX ORDER — CARBOPROST TROMETHAMINE 250 UG/ML
250 INJECTION, SOLUTION INTRAMUSCULAR
Status: DISCONTINUED | OUTPATIENT
Start: 2023-11-20 | End: 2023-11-22 | Stop reason: HOSPADM

## 2023-11-20 RX ADMIN — SODIUM CITRATE AND CITRIC ACID MONOHYDRATE 30 ML: 500; 334 SOLUTION ORAL at 03:11

## 2023-11-20 RX ADMIN — IBUPROFEN 600 MG: 600 TABLET, FILM COATED ORAL at 08:11

## 2023-11-20 RX ADMIN — SODIUM CHLORIDE, POTASSIUM CHLORIDE, SODIUM LACTATE AND CALCIUM CHLORIDE: 600; 310; 30; 20 INJECTION, SOLUTION INTRAVENOUS at 01:11

## 2023-11-20 RX ADMIN — Medication 95 MILLI-UNITS/MIN: at 09:11

## 2023-11-20 RX ADMIN — MISOPROSTOL 800 MCG: 100 TABLET ORAL at 07:11

## 2023-11-20 RX ADMIN — Medication 95 MILLI-UNITS/MIN: at 08:11

## 2023-11-20 RX ADMIN — SODIUM CHLORIDE, POTASSIUM CHLORIDE, SODIUM LACTATE AND CALCIUM CHLORIDE 1000 ML: 600; 310; 30; 20 INJECTION, SOLUTION INTRAVENOUS at 02:11

## 2023-11-20 RX ADMIN — BUPIVACAINE HYDROCHLORIDE 10 ML: 2.5 INJECTION, SOLUTION EPIDURAL; INFILTRATION; INTRACAUDAL; PERINEURAL at 03:11

## 2023-11-20 RX ADMIN — Medication 2 MILLI-UNITS/MIN: at 02:11

## 2023-11-20 RX ADMIN — Medication 334 MILLI-UNITS/MIN: at 07:11

## 2023-11-20 NOTE — ANESTHESIA PROCEDURE NOTES
Epidural    Patient location during procedure: OB   Reason for block: primary anesthetic   Reason for block: labor analgesia requested by patient and obstetrician  Diagnosis: labor   Start time: 11/20/2023 3:45 PM  Timeout: 11/20/2023 3:44 PM  End time: 11/20/2023 3:45 PM    Staffing  Performing Provider: Leighton Carney MD  Authorizing Provider: Leighton Carney MD    Staffing  Performed by: Leighton Carney MD  Authorized by: Leighton Carney MD        Preanesthetic Checklist  Completed: patient identified, IV checked, site marked, risks and benefits discussed, surgical consent, monitors and equipment checked, pre-op evaluation, timeout performed, anesthesia consent given, hand hygiene performed and patient being monitored  Preparation  Patient position: sitting  Prep: ChloraPrep  Patient monitoring: ECG and Blood Pressure  Reason for block: primary anesthetic   Epidural  Skin Anesthetic: lidocaine 1%  Skin Wheal: 3 mL  Administration type: continuous  Approach: midline  Interspace: L3-4    Injection technique: LISETTE saline  Needle and Epidural Catheter  Needle type: Tuohy   Needle gauge: 17  Needle length: 3.5 inches  Catheter type: [employer]  Catheter size: 19 G  Insertion Attempts: 2  Test dose: 5 mL of lidocaine 1.5% with Epi 1-to-200,000  Additional Documentation: incremental injection, negative aspiration for heme and CSF, no paresthesia on injection, no signs/symptoms of IV or SA injection, no significant complaints from patient and no significant pain on injection  Needle localization: anatomical landmarks  Assessment  Upper dermatomal levels - Left: T12  Right: T12   Dermatomal levels determined by alcohol wipe  Ease of block: easy  Patient's tolerance of the procedure: comfortable throughout block and no complaints No inadvertent dural puncture with Tuohy.  Dural puncture not performed with spinal needle    Medications:    Medications: bupivacaine (pf) (MARCAINE) injection 0.25% - Epidural   10  mL - 11/20/2023 3:46:00 PM

## 2023-11-20 NOTE — H&P
[facility] - Labor and Delivery  Obstetrics  History & Physical    Patient Name: Zuleika Morales  MRN: [id number]  Admission Date: 2023  Primary Care Provider: Deana Hoyt NP    Subjective:     Principal Problem:dereased fm  Cat II nst bpp 6/10      History of Present Illness: see above    Obstetric HPI:  Patient reports Date/time of onset: yesteday getting worse contractions, active fetal movement, No vaginal bleeding , No loss of fluid     This pregnancy has been complicated by thrombocytopenia    OB History    Para Term  AB Living   4 2 2 0 1 2   SAB IAB Ectopic Multiple Live Births   1 0 0 0 2      # Outcome Date GA Lbr Jaime/2nd Weight Sex Delivery Anes PTL Lv   4 Current            3 Term 21    F Vag-Spont   CHEN   2 Term 19    F Vag-Spont   CHEN   1 SAB 2017     SAB   FD     Past Medical History:   Diagnosis Date    Thrombocytopenia, unspecified 2023     Past Surgical History:   Procedure Laterality Date    DILATION AND CURETTAGE OF UTERUS         PTA Medications   Medication Sig    cephALEXin (KEFLEX) 250 mg/5 mL suspension Take 250 mg by mouth every 6 (six) hours. 5 mg daily    prenatal vit/iron fum/folic ac (PRENATAL 1+1 ORAL) Take by mouth.    promethazine (PHENERGAN) 12.5 MG Tab Take 1 tablet (12.5 mg total) by mouth 2 (two) times a day.       Review of patient's allergies indicates:  No Known Allergies     Family History       Problem Relation (Age of Onset)    Diabetes Maternal Grandmother, Maternal Grandfather    Hypertension Maternal Grandmother, Maternal Grandfather    Miscarriages / Stillbirths Maternal Grandmother    Seizures Brother          Tobacco Use    Smoking status: Never    Smokeless tobacco: Never   Substance and Sexual Activity    Alcohol use: Not Currently    Drug use: Never    Sexual activity: Yes     Partners: Male     Review of Systems   Objective:     Vital Signs (Most Recent):    Vital Signs (24h Range):  BP: (115)/(70) 115/70         There is no height or weight on file to calculate BMI.    FHT: 145Cat 2 (reassuring) in office  TOCO:  Q Q5-8 minutes    Physical Exam    Cervix:  Dilation:  1  Effacement:  50%  Station: -3  Presentation: Vertex     Significant Labs:  Lab Results   Component Value Date    GROUPTRH O POS 2023       I have personallly reviewed all pertinent lab results from the last 24 hours.    Assessment/Plan:     23 y.o. female  at 37w2d for:    There are no hospital problems to display for this patient.      Admit to [facility] for oligo   Pih labs  iol    Kareem Stephens MD  Obstetrics  [facility] - Labor and Delivery

## 2023-11-20 NOTE — ANESTHESIA PREPROCEDURE EVALUATION
11/20/2023  Zuleika Morales is a 23 y.o., female.      Pre-op Assessment    I have reviewed the Patient Summary Reports.     I have reviewed the Nursing Notes. I have reviewed the NPO Status.   I have reviewed the Medications.     Review of Systems  Anesthesia Hx:  No problems with previous Anesthesia                Hematology/Oncology:    Oncology Normal                Hematology Comments: 115 plt's                    EENT/Dental:  EENT/Dental Normal           Cardiovascular:     Hypertension                                        Pulmonary:  Pulmonary Normal                       Renal/:  Renal/ Normal                 Hepatic/GI:  Hepatic/GI Normal                 Musculoskeletal:  Musculoskeletal Normal                Neurological:  Neurology Normal                                      Endocrine:  Endocrine Normal            Dermatological:  Skin Normal    Psych:  Psychiatric Normal                  Physical Exam  General: Well nourished, Cooperative, Alert and Oriented    Airway:  Mallampati: II   Mouth Opening: Normal  TM Distance: Normal  Tongue: Normal  Neck ROM: Normal ROM    Dental:  Intact    Chest/Lungs:  Clear to auscultation    Heart:  Rate: Normal    Anesthesia Plan  Type of Anesthesia, risks & benefits discussed:    Anesthesia Type: Epidural  Intra-op Monitoring Plan: Standard ASA Monitors  Post Op Pain Control Plan: multimodal analgesia  Informed Consent: Informed consent signed with the Patient and all parties understand the risks and agree with anesthesia plan.  All questions answered.   ASA Score: 2  Day of Surgery Review of History & Physical: H&P Update referred to the surgeon/provider.I have interviewed and examined the patient. I have reviewed the patient's H&P dated:     Ready For Surgery From Anesthesia Perspective.   .

## 2023-11-21 PROCEDURE — 11000001 HC ACUTE MED/SURG PRIVATE ROOM

## 2023-11-21 PROCEDURE — 25000003 PHARM REV CODE 250: Performed by: OBSTETRICS & GYNECOLOGY

## 2023-11-21 RX ADMIN — SIMETHICONE 80 MG: 80 TABLET, CHEWABLE ORAL at 06:11

## 2023-11-21 RX ADMIN — PRENATAL VITAMINS-IRON FUMARATE 27 MG IRON-FOLIC ACID 0.8 MG TABLET 1 TABLET: at 07:11

## 2023-11-21 RX ADMIN — ACETAMINOPHEN 650 MG: 325 TABLET, FILM COATED ORAL at 07:11

## 2023-11-21 RX ADMIN — IBUPROFEN 600 MG: 600 TABLET, FILM COATED ORAL at 10:11

## 2023-11-21 RX ADMIN — DOCUSATE SODIUM 200 MG: 100 CAPSULE, LIQUID FILLED ORAL at 10:11

## 2023-11-21 RX ADMIN — IBUPROFEN 600 MG: 600 TABLET, FILM COATED ORAL at 04:11

## 2023-11-21 RX ADMIN — OXYCODONE HYDROCHLORIDE 10 MG: 5 TABLET ORAL at 01:11

## 2023-11-21 RX ADMIN — OXYCODONE HYDROCHLORIDE 10 MG: 5 TABLET ORAL at 07:11

## 2023-11-21 RX ADMIN — IBUPROFEN 600 MG: 600 TABLET, FILM COATED ORAL at 02:11

## 2023-11-21 NOTE — ANESTHESIA POSTPROCEDURE EVALUATION
Anesthesia Post Evaluation    Patient: Zuleika Morales    Procedure(s) Performed: * No procedures listed *    Final Anesthesia Type: epidural (Pt moving all extremities, walking  No complaints of numbess or headache)      Patient location during evaluation: labor & delivery  Patient participation: Yes- Able to Participate  Level of consciousness: awake and alert  Post-procedure vital signs: reviewed and stable  Pain management: adequate  Airway patency: patent    PONV status at discharge: No PONV  Anesthetic complications: no      Cardiovascular status: blood pressure returned to baseline, hemodynamically stable and stable  Respiratory status: unassisted, spontaneous ventilation and room air  Hydration status: euvolemic  Follow-up not needed.          Vitals Value Taken Time   /87 11/21/23 0747   Temp 36.6 °C (97.9 °F) 11/21/23 0747   Pulse 102 11/21/23 0747   Resp 16 11/21/23 0758   SpO2 100 % 11/20/23 1811   Vitals shown include unvalidated device data.      No case tracking events are documented in the log.      Pain/Junaid Score: Pain Rating Prior to Med Admin: 2 (11/21/2023 10:50 AM)  Pain Rating Post Med Admin: 2 (11/21/2023  8:58 AM)

## 2023-11-21 NOTE — L&D DELIVERY NOTE
[facility] - Labor and Delivery  Vaginal Delivery   Operative Note    SUMMARY     Normal spontaneous vaginal delivery of live infant, was placed on mothers abdomen for skin to skin and bulb suctioning performed.  Infant delivered position LARS over intact perineum.  Nuchal cord: No.    Spontaneous delivery of placenta and IV pitocin given noting good uterine tone.  1st degree laceration noted.  Patient tolerated delivery well. Sponge needle and lap counted correctly x2.    Indications: <principal problem not specified>  Pregnancy complicated by:   Patient Active Problem List   Diagnosis    Benign gestational thrombocytopenia in third trimester    Increased BMI affecting pregnancy in third trimester    BMI at 37 at the initial MFM consult    Hyperemesis gravidarum, antepartum    Elevated blood pressure affecting pregnancy in third trimester, antepartum    Postnasal drip     Admitting GA: 37w2d    Delivery Information for Jovan Morales    Birth information:  YOB: 2024   Time of birth: 7:15 PM   Sex: male   Head Delivery Date/Time:     Delivery type:    Gestational Age: 37w2d        Delivery Providers    Delivering clinician:            Measurements    Weight:   Length:          Apgars    Living status:   Apgar Component Scores:  1 min.:  5 min.:  10 min.:  15 min.:  20 min.:    Skin color:         Heart rate:         Reflex irritability:         Muscle tone:         Respiratory effort:         Total:                                  Interventions/Resuscitation           Cord    No data filed       Placenta    Placenta delivery date/time:   Placenta removal:            Labor Events:       labor: No     Labor Onset Date/Time:         Dilation Complete Date/Time: 2023 18:55     Start Pushing Date/Time: 2023 19:05     Rupture Date/Time: 23  1635         Rupture type:          Fluid Amount:       Fluid Color:        steroids: None     Antibiotics given for  GBS: No     Induction: oxytocin     Indications for induction:        Augmentation: oxytocin     Indications for augmentation:       Labor complications: None     Additional complications: Oligohydramnios;Thrombocytopenia        Cervical ripening:                     Delivery:      Episiotomy:       Indication for Episiotomy:       Perineal Lacerations:   Repaired:      Periurethral Laceration:   Repaired:     Labial Laceration:   Repaired:     Sulcus Laceration:   Repaired:     Vaginal Laceration:   Repaired:     Cervical Laceration:   Repaired:     Repair suture:       Repair # of packets:       Last Value - EBL - Nursing (mL):       Sum - EBL - Nursing (mL): 0     Last Value - EBL - Anesthesia (mL):      Calculated QBL (mL):       Vaginal Sweep Performed:       Surgicount Correct:         Other providers:            Details (if applicable):  Trial of Labor      Categorization:      Priority:     Indications for :     Incision Type:       Additional  information:  Forceps:    Vacuum:    Breech:    Observed anomalies    Other (Comments):

## 2023-11-21 NOTE — NURSING
Pt assisted to the bathroom with steady gait per RN. PT able to void, ovidio care performed, gown changed, assisted back to wheel chair and wheeled to mbu, room 304.

## 2023-11-21 NOTE — PROGRESS NOTES
Pt seen this am no complaints.   Min lochia  Vss afebrile  Aao3  Abd nt nd   Ext nt  Sp  pp1 doing well

## 2023-11-22 VITALS
SYSTOLIC BLOOD PRESSURE: 126 MMHG | TEMPERATURE: 98 F | HEART RATE: 84 BPM | OXYGEN SATURATION: 99 % | RESPIRATION RATE: 20 BRPM | DIASTOLIC BLOOD PRESSURE: 86 MMHG

## 2023-11-22 PROBLEM — O41.00X0 OLIGOHYDRAMNIOS: Status: ACTIVE | Noted: 2023-11-22

## 2023-11-22 PROCEDURE — 25000003 PHARM REV CODE 250: Performed by: OBSTETRICS & GYNECOLOGY

## 2023-11-22 RX ORDER — OXYCODONE AND ACETAMINOPHEN 5; 325 MG/1; MG/1
1 TABLET ORAL EVERY 6 HOURS PRN
Qty: 14 TABLET | Refills: 0 | Status: SHIPPED | OUTPATIENT
Start: 2023-11-22

## 2023-11-22 RX ADMIN — IBUPROFEN 600 MG: 600 TABLET, FILM COATED ORAL at 10:11

## 2023-11-22 RX ADMIN — IBUPROFEN 600 MG: 600 TABLET, FILM COATED ORAL at 04:11

## 2023-11-22 RX ADMIN — PRENATAL VITAMINS-IRON FUMARATE 27 MG IRON-FOLIC ACID 0.8 MG TABLET 1 TABLET: at 08:11

## 2023-11-22 RX ADMIN — ACETAMINOPHEN 650 MG: 325 TABLET, FILM COATED ORAL at 08:11

## 2023-11-22 NOTE — DISCHARGE SUMMARY
"[facility] - 3rd Floor Mother/Baby Unit  Obstetrics  Discharge Summary      Patient Name: Zuleika Morales  MRN: [id number]  Admission Date: 2023  Hospital Length of Stay: 2 days  Discharge Date and Time:  2023 7:54 AM  Attending Physician: Caterina Stephens MD   Discharging Provider: Caterina Stephens MD  Primary Care Provider: Deana Hoyt NP    HPI:      * No surgery found *     Hospital Course: stable    Consults (From admission, onward)          Status Ordering Provider     Inpatient consult to Anesthesiology  Once        Provider:  (Not yet assigned)    Acknowledged CATERINA STEPHENS            Final Active Diagnoses:    Diagnosis Date Noted POA    PRINCIPAL PROBLEM:  Oligohydramnios [O41.00X0] 2023 Unknown      Problems Resolved During this Admission:        Labs: All labs within the past 24 hours have been reviewed    Feeding Method: both breast and bottle    Immunizations       Date Immunization Status Dose Route/Site Given by    23 MMR Incomplete 0.5 mL Subcutaneous/     23 Tdap Incomplete 0.5 mL Intramuscular/             Delivery:    Episiotomy: None   Lacerations: 1st   Repair suture:     Repair # of packets: 1   Blood loss (ml):       Birth information:  YOB: 2024   Time of birth: 7:15 PM   Sex: male   Delivery type: Vaginal, Spontaneous   Gestational Age: 37w2d     Measurements    Weight: 3544 g  Weight (lbs): 7 lb 13 oz  Length: 50.8 cm  Length (in): 20"  Head circumference: 13.5 cm         Delivery Clinician: Delivery Providers    Delivering clinician: Caterina Stephens MD   Provider Role    Julius Islas RN Registered Nurse    Pippa August RN Registered Nurse    Juany Weaver RN Registered Nurse             Additional  information:  Forceps:    Vacuum:    Breech:    Observed anomalies      Living?:     Apgars    Living status: Living  Apgar Component Scores:  1 min.:  5 min.:  10 min.:  15 min.:  20 min.:    Skin color:  0  1    "    Heart rate:  2  2       Reflex irritability:  2  2       Muscle tone:  2  2       Respiratory effort:  2  2       Total:  8  9       Apgars assigned by: MARLEN SCHAFFER RN         Placenta: Delivered:       appearance  Pending Diagnostic Studies:       None            Discharged Condition: stable    Disposition: Home or Self Care    Follow Up:    Patient Instructions:   No discharge procedures on file.  Medications:  Current Discharge Medication List        START taking these medications    Details   oxyCODONE-acetaminophen (PERCOCET) 5-325 mg per tablet Take 1 tablet by mouth every 6 (six) hours as needed for Pain.  Qty: 14 tablet, Refills: 0    Comments: Quantity prescribed more than 7 day supply? Yes, quantity medically necessary           CONTINUE these medications which have NOT CHANGED    Details   cephALEXin (KEFLEX) 250 mg/5 mL suspension Take 250 mg by mouth every 6 (six) hours. 5 mg daily      prenatal vit/iron fum/folic ac (PRENATAL 1+1 ORAL) Take by mouth.      promethazine (PHENERGAN) 12.5 MG Tab Take 1 tablet (12.5 mg total) by mouth 2 (two) times a day.  Qty: 60 tablet, Refills: 1    Associated Diagnoses: Encounter for supervision of normal pregnancy in multigravida in first trimester             Kareem Stephens MD  Obstetrics  [facility] - 3rd Floor Mother/Baby Unit

## 2023-11-24 ENCOUNTER — PATIENT MESSAGE (OUTPATIENT)
Dept: ADMINISTRATIVE | Facility: OTHER | Age: 23
End: 2023-11-24
Payer: MEDICAID

## 2024-05-06 DIAGNOSIS — J03.91 RECURRENT TONSILLITIS: Primary | ICD-10-CM

## 2024-05-09 ENCOUNTER — LAB VISIT (OUTPATIENT)
Dept: LAB | Facility: HOSPITAL | Age: 24
End: 2024-05-09
Payer: MEDICAID

## 2024-05-09 DIAGNOSIS — Z13.21 SCREENING FOR MALNUTRITION: Primary | ICD-10-CM

## 2024-05-09 DIAGNOSIS — Z00.00 ROUTINE GENERAL MEDICAL EXAMINATION AT A HEALTH CARE FACILITY: ICD-10-CM

## 2024-05-09 LAB
25(OH)D3+25(OH)D2 SERPL-MCNC: 13 NG/ML (ref 30–80)
ALBUMIN SERPL-MCNC: 4.2 G/DL (ref 3.5–5)
ALBUMIN/GLOB SERPL: 1.3 RATIO (ref 1.1–2)
ALP SERPL-CCNC: 75 UNIT/L (ref 40–150)
ALT SERPL-CCNC: 24 UNIT/L (ref 0–55)
AST SERPL-CCNC: 25 UNIT/L (ref 5–34)
BASOPHILS # BLD AUTO: 0.06 X10(3)/MCL
BASOPHILS NFR BLD AUTO: 0.5 %
BILIRUB SERPL-MCNC: 0.4 MG/DL
BUN SERPL-MCNC: 6.9 MG/DL (ref 7–18.7)
CALCIUM SERPL-MCNC: 9.7 MG/DL (ref 8.4–10.2)
CHLORIDE SERPL-SCNC: 106 MMOL/L (ref 98–107)
CHOLEST SERPL-MCNC: 160 MG/DL
CHOLEST/HDLC SERPL: 3 {RATIO} (ref 0–5)
CO2 SERPL-SCNC: 25 MMOL/L (ref 22–29)
CREAT SERPL-MCNC: 0.65 MG/DL (ref 0.55–1.02)
EOSINOPHIL # BLD AUTO: 0.11 X10(3)/MCL (ref 0–0.9)
EOSINOPHIL NFR BLD AUTO: 1 %
ERYTHROCYTE [DISTWIDTH] IN BLOOD BY AUTOMATED COUNT: 12 % (ref 11.5–17)
GFR SERPLBLD CREATININE-BSD FMLA CKD-EPI: >60 ML/MIN/1.73/M2
GLOBULIN SER-MCNC: 3.3 GM/DL (ref 2.4–3.5)
GLUCOSE SERPL-MCNC: 97 MG/DL (ref 74–100)
HCT VFR BLD AUTO: 39.5 % (ref 37–47)
HDLC SERPL-MCNC: 52 MG/DL (ref 35–60)
HGB BLD-MCNC: 13.1 G/DL (ref 12–16)
IMM GRANULOCYTES # BLD AUTO: 0.03 X10(3)/MCL (ref 0–0.04)
IMM GRANULOCYTES NFR BLD AUTO: 0.3 %
LDLC SERPL CALC-MCNC: 99 MG/DL (ref 50–140)
LYMPHOCYTES # BLD AUTO: 3.27 X10(3)/MCL (ref 0.6–4.6)
LYMPHOCYTES NFR BLD AUTO: 29.9 %
MCH RBC QN AUTO: 29.8 PG (ref 27–31)
MCHC RBC AUTO-ENTMCNC: 33.2 G/DL (ref 33–36)
MCV RBC AUTO: 90 FL (ref 80–94)
MONOCYTES # BLD AUTO: 0.75 X10(3)/MCL (ref 0.1–1.3)
MONOCYTES NFR BLD AUTO: 6.9 %
NEUTROPHILS # BLD AUTO: 6.72 X10(3)/MCL (ref 2.1–9.2)
NEUTROPHILS NFR BLD AUTO: 61.4 %
PLATELET # BLD AUTO: 165 X10(3)/MCL (ref 130–400)
PMV BLD AUTO: 13.3 FL (ref 7.4–10.4)
POTASSIUM SERPL-SCNC: 4.4 MMOL/L (ref 3.5–5.1)
PROT SERPL-MCNC: 7.5 GM/DL (ref 6.4–8.3)
RBC # BLD AUTO: 4.39 X10(6)/MCL (ref 4.2–5.4)
SODIUM SERPL-SCNC: 137 MMOL/L (ref 136–145)
TRIGL SERPL-MCNC: 45 MG/DL (ref 37–140)
VLDLC SERPL CALC-MCNC: 9 MG/DL
WBC # SPEC AUTO: 10.94 X10(3)/MCL (ref 4.5–11.5)

## 2024-05-09 PROCEDURE — 84443 ASSAY THYROID STIM HORMONE: CPT

## 2024-05-09 PROCEDURE — 36415 COLL VENOUS BLD VENIPUNCTURE: CPT

## 2024-05-09 PROCEDURE — 80061 LIPID PANEL: CPT

## 2024-05-09 PROCEDURE — 85025 COMPLETE CBC W/AUTO DIFF WBC: CPT

## 2024-05-09 PROCEDURE — 80053 COMPREHEN METABOLIC PANEL: CPT

## 2024-05-09 PROCEDURE — 84439 ASSAY OF FREE THYROXINE: CPT

## 2024-05-09 PROCEDURE — 82306 VITAMIN D 25 HYDROXY: CPT

## 2024-05-10 LAB
T4 FREE SERPL-MCNC: 0.92 NG/DL (ref 0.7–1.48)
TSH SERPL-ACNC: 0.91 UIU/ML (ref 0.35–4.94)

## 2024-09-04 PROCEDURE — 87624 HPV HI-RISK TYP POOLED RSLT: CPT | Performed by: OBSTETRICS & GYNECOLOGY

## 2024-09-04 PROCEDURE — 87591 N.GONORRHOEAE DNA AMP PROB: CPT | Performed by: OBSTETRICS & GYNECOLOGY

## 2024-09-04 PROCEDURE — 87661 TRICHOMONAS VAGINALIS AMPLIF: CPT | Performed by: OBSTETRICS & GYNECOLOGY

## 2024-09-04 PROCEDURE — 87491 CHLMYD TRACH DNA AMP PROBE: CPT | Performed by: OBSTETRICS & GYNECOLOGY

## 2024-09-24 ENCOUNTER — OFFICE VISIT (OUTPATIENT)
Dept: OTOLARYNGOLOGY | Facility: CLINIC | Age: 24
End: 2024-09-24
Payer: MEDICAID

## 2024-09-24 VITALS
HEIGHT: 63 IN | BODY MASS INDEX: 37.56 KG/M2 | DIASTOLIC BLOOD PRESSURE: 82 MMHG | TEMPERATURE: 98 F | RESPIRATION RATE: 18 BRPM | HEART RATE: 82 BPM | WEIGHT: 212 LBS | SYSTOLIC BLOOD PRESSURE: 113 MMHG

## 2024-09-24 DIAGNOSIS — J03.91 RECURRENT TONSILLITIS: ICD-10-CM

## 2024-09-24 PROCEDURE — 99214 OFFICE O/P EST MOD 30 MIN: CPT | Mod: PBBFAC | Performed by: STUDENT IN AN ORGANIZED HEALTH CARE EDUCATION/TRAINING PROGRAM

## 2024-09-24 RX ORDER — SODIUM CHLORIDE 9 MG/ML
INJECTION, SOLUTION INTRAVENOUS CONTINUOUS
OUTPATIENT
Start: 2024-09-24

## 2024-09-24 NOTE — PROGRESS NOTES
[facility]  Otolaryngology Clinic Note    Zuleika Morales  Encounter Date: 9/24/2024  YOB: 2000  Physician: MD Lynsey    Chief Complaint:  Tonsillitis    HPI: Zuleika Morales is a 24 y.o. female with no past medical history who presents with recurrent tonsillitis.  Every year 3-4 times patient is diagnosed with strep tonsillitis requiring antibiotics.  She also misses 1 week of work because of her tonsillitis every 3 or 4 months.  She has been getting tonsil infections since she was 3 years old.  She reports that when she gets tonsillitis she is unable to sleep due to trouble breathing.  Patient also reports that she has nighttime snoring.  When she gets tonsillitis she has dysphagia and odynophagia.  Has not had any previous ear nose and throat surgeries  Not on any blood thinners.  She was pregnant in 1 year ago, not currently pregnant    ROS:   General: Negative except per HPI  Skin: Denies rash, ulcer, or lesion.  Eyes: Denies vision changes or diplopia.  Ears: Negative except per HPI  Nose: Negative except per HPI  Throat/mouth: Negative except per HPI  Cardiovascular: Negative except per HPI  Respiratory: Negative except per HPI  Neck: Negative except per HPI  Endocrine: Negative except per HPI  Neurologic: Negative except per HPI    Other 10-point review of systems negative except per HPI      Review of patient's allergies indicates:  No Known Allergies    Past Medical History:   Diagnosis Date    Thrombocytopenia, unspecified 09/2023       Past Surgical History:   Procedure Laterality Date    DILATION AND CURETTAGE OF UTERUS  2018       Social History     Socioeconomic History    Marital status:    Tobacco Use    Smoking status: Never    Smokeless tobacco: Never   Substance and Sexual Activity    Alcohol use: Not Currently    Drug use: Never    Sexual activity: Yes     Partners: Male     Social Determinants of Health     Financial Resource Strain: Low Risk   (3/22/2023)    [facility], [facility]    Overall Financial Resource Strain (CARDIA)     Difficulty of Paying Living Expenses: Not hard at all   Food Insecurity: Unknown (3/22/2023)    [facility], [facility]    Hunger Vital Sign     Worried About Running Out of Food in the Last Year: Never true   Transportation Needs: Unknown (3/22/2023)    [facility], [facility]    PRAPARE - Transportation     Lack of Transportation (Medical): No   Physical Activity: Unknown (3/22/2023)    [facility], [facility]    Exercise Vital Sign     Days of Exercise per Week: 5 days   Stress: No Stress Concern Present (3/22/2023)    [facility], [facility]    [place] citizen [place] of Occupational Health - Occupational Stress Questionnaire     Feeling of Stress : Not at all   Housing Stability: Unknown (3/22/2023)    [facility], [facility] and Its [facility] and Affiliates    Housing Stability Vital Sign     Unable to Pay for Housing in the Last Year: No     Unstable Housing in the Last Year: No       Family History  "  Problem Relation Name Age of Onset    Miscarriages / Stillbirths Maternal Grandmother      Hypertension Maternal Grandmother      Diabetes Maternal Grandmother      Hypertension Maternal Grandfather      Diabetes Maternal Grandfather      Seizures Brother         Outpatient Encounter Medications as of 9/24/2024   Medication Sig Dispense Refill    MIRENA 21 mcg/24 hours (8 yrs) 52 mg IUD SMARTSIG:Vaginal      cephALEXin (KEFLEX) 250 mg/5 mL suspension Take 250 mg by mouth every 6 (six) hours. 5 mg daily (Patient not taking: Reported on 9/24/2024)      oxyCODONE-acetaminophen (PERCOCET) 5-325 mg per tablet Take 1 tablet by mouth every 6 (six) hours as needed for Pain. (Patient not taking: Reported on 9/24/2024) 14 tablet 0    prenatal vit/iron fum/folic ac (PRENATAL 1+1 ORAL) Take by mouth. (Patient not taking: Reported on 9/24/2024)      promethazine (PHENERGAN) 12.5 MG Tab Take 1 tablet (12.5 mg total) by mouth 2 (two) times a day. (Patient not taking: Reported on 9/24/2024) 60 tablet 1     No facility-administered encounter medications on file as of 9/24/2024.       Physical Exam:  Vitals:    09/24/24 1009   BP: 113/82   BP Location: Right arm   Patient Position: Sitting   BP Method: Large (Automatic)   Pulse: 82   Resp: 18   Temp: 97.9 °F (36.6 °C)   TempSrc: Oral   Weight: 96.2 kg (212 lb)   Height: 5' 3" (1.6 m)       Constitutional  General Appearance: well nourished, well-developed, AAO x3, NAD  HEENT  Eyes: PEERLA, EOMI, normal conjunctivae  Ears: Hearing well at conversation level; sanz - no lateralization, Rinne AC > BC   AD: auricle normal, EAC normal, TM intact, no SARAH   AS: auricle normal, EAC normal, TM intact, no SARAH   Vestibular: ambulates without gait disturbance  Nose: septum midline, no inferior turbinate hypertrophy, no polyps, moist mucosa without erythema or blue hue  OC/OP: dentition moderate, no oral lesions, tongue/FOM/BOT- soft, no leukoplakia/ulcerations/ tenderness, tonsils " 3+  Nasopharynx, Hypopharynx, and Larynx:    Indirect: attempted, limited view due to patient intolerance  Neck: soft, non-tender, no palpable lymph nodes   Thyroid region- no nodules or goiter  Neuro: CN II - XII intact bilaterally  Cardiovascular: peripheral pulses palpable  Respiratory: non-labored respirations  Psychiatric: oriented to time, place and person, no depression, anxiety or agitation  Pertinent Data:  ? LABS:  ? AUDIO:  ? CT Scan:    Imaging:   I personally reviewed the following images:    Summary of Outside Records:      Assessment/Plan:  Zuleika Morales is a 24 y.o. female with a recurrent strep tonsillitis about 3-4 infections per year since she was 3yrs old.  Patient with 3+ tonsils.  Discussed the risks benefits and alternatives of tonsillectomy.  We discussed that this is 1 of the most painful procedures we offer and it will require at least 2 weeks of downtime.  Patient understands the pain associated with the procedure.  We discussed the risks of tonsillectomy including pain, infection, bleeding, need for reoperation, control of hemorrhage, VPI, tongue numbness, injury to teeth lips and gums    - tonsillectomy scheduled for October 4th, consented today  - return to clinic one-month postop      LIMA Menon MD  [facility] Department of Otolaryngology  [facility]

## 2024-09-24 NOTE — H&P (VIEW-ONLY)
[facility]  Otolaryngology Clinic Note    Zuleika Morales  Encounter Date: 9/24/2024  YOB: 2000  Physician: MD Lynsey    Chief Complaint:  Tonsillitis    HPI: Zuleika Morales is a 24 y.o. female with no past medical history who presents with recurrent tonsillitis.  Every year 3-4 times patient is diagnosed with strep tonsillitis requiring antibiotics.  She also misses 1 week of work because of her tonsillitis every 3 or 4 months.  She has been getting tonsil infections since she was 3 years old.  She reports that when she gets tonsillitis she is unable to sleep due to trouble breathing.  Patient also reports that she has nighttime snoring.  When she gets tonsillitis she has dysphagia and odynophagia.  Has not had any previous ear nose and throat surgeries  Not on any blood thinners.  She was pregnant in 1 year ago, not currently pregnant    ROS:   General: Negative except per HPI  Skin: Denies rash, ulcer, or lesion.  Eyes: Denies vision changes or diplopia.  Ears: Negative except per HPI  Nose: Negative except per HPI  Throat/mouth: Negative except per HPI  Cardiovascular: Negative except per HPI  Respiratory: Negative except per HPI  Neck: Negative except per HPI  Endocrine: Negative except per HPI  Neurologic: Negative except per HPI    Other 10-point review of systems negative except per HPI      Review of patient's allergies indicates:  No Known Allergies    Past Medical History:   Diagnosis Date    Thrombocytopenia, unspecified 09/2023       Past Surgical History:   Procedure Laterality Date    DILATION AND CURETTAGE OF UTERUS  2018       Social History     Socioeconomic History    Marital status:    Tobacco Use    Smoking status: Never    Smokeless tobacco: Never   Substance and Sexual Activity    Alcohol use: Not Currently    Drug use: Never    Sexual activity: Yes     Partners: Male     Social Determinants of Health     Financial Resource Strain: Low Risk   (3/22/2023)    [facility], [facility]    Overall Financial Resource Strain (CARDIA)     Difficulty of Paying Living Expenses: Not hard at all   Food Insecurity: Unknown (3/22/2023)    [facility], [facility]    Hunger Vital Sign     Worried About Running Out of Food in the Last Year: Never true   Transportation Needs: Unknown (3/22/2023)    [facility]    PRAPARE - Transportation     Lack of Transportation (Medical): No   Physical Activity: Unknown (3/22/2023)    [facility], [facility]    Exercise Vital Sign     Days of Exercise per Week: 5 days   Stress: No Stress Concern Present (3/22/2023)    [facility], [facility]    [place] of Occupational Health - Occupational Stress Questionnaire     Feeling of Stress : Not at all   Housing Stability: Unknown (3/22/2023)    [facility], [facility] and [facility]    Housing Stability Vital Sign     Unable to Pay for Housing in the Last Year: No     Unstable Housing in the Last Year: No       Family History  "  Problem Relation Name Age of Onset    Miscarriages / Stillbirths Maternal Grandmother      Hypertension Maternal Grandmother      Diabetes Maternal Grandmother      Hypertension Maternal Grandfather      Diabetes Maternal Grandfather      Seizures Brother         Outpatient Encounter Medications as of 9/24/2024   Medication Sig Dispense Refill    MIRENA 21 mcg/24 hours (8 yrs) 52 mg IUD SMARTSIG:Vaginal      cephALEXin (KEFLEX) 250 mg/5 mL suspension Take 250 mg by mouth every 6 (six) hours. 5 mg daily (Patient not taking: Reported on 9/24/2024)      oxyCODONE-acetaminophen (PERCOCET) 5-325 mg per tablet Take 1 tablet by mouth every 6 (six) hours as needed for Pain. (Patient not taking: Reported on 9/24/2024) 14 tablet 0    prenatal vit/iron fum/folic ac (PRENATAL 1+1 ORAL) Take by mouth. (Patient not taking: Reported on 9/24/2024)      promethazine (PHENERGAN) 12.5 MG Tab Take 1 tablet (12.5 mg total) by mouth 2 (two) times a day. (Patient not taking: Reported on 9/24/2024) 60 tablet 1     No facility-administered encounter medications on file as of 9/24/2024.       Physical Exam:  Vitals:    09/24/24 1009   BP: 113/82   BP Location: Right arm   Patient Position: Sitting   BP Method: Large (Automatic)   Pulse: 82   Resp: 18   Temp: 97.9 °F (36.6 °C)   TempSrc: Oral   Weight: 96.2 kg (212 lb)   Height: 5' 3" (1.6 m)       Constitutional  General Appearance: well nourished, well-developed, AAO x3, NAD  HEENT  Eyes: PEERLA, EOMI, normal conjunctivae  Ears: Hearing well at conversation level; sanz - no lateralization, Rinne AC > BC   AD: auricle normal, EAC normal, TM intact, no SARAH   AS: auricle normal, EAC normal, TM intact, no SARAH   Vestibular: ambulates without gait disturbance  Nose: septum midline, no inferior turbinate hypertrophy, no polyps, moist mucosa without erythema or blue hue  OC/OP: dentition moderate, no oral lesions, tongue/FOM/BOT- soft, no leukoplakia/ulcerations/ tenderness, tonsils " 3+  Nasopharynx, Hypopharynx, and Larynx:    Indirect: attempted, limited view due to patient intolerance  Neck: soft, non-tender, no palpable lymph nodes   Thyroid region- no nodules or goiter  Neuro: CN II - XII intact bilaterally  Cardiovascular: peripheral pulses palpable  Respiratory: non-labored respirations  Psychiatric: oriented to time, place and person, no depression, anxiety or agitation  Pertinent Data:  ? LABS:  ? AUDIO:  ? CT Scan:    Imaging:   I personally reviewed the following images:    Summary of Outside Records:      Assessment/Plan:  Zuleika Morales is a 24 y.o. female with a recurrent strep tonsillitis about 3-4 infections per year since she was 3yrs old.  Patient with 3+ tonsils.  Discussed the risks benefits and alternatives of tonsillectomy.  We discussed that this is 1 of the most painful procedures we offer and it will require at least 2 weeks of downtime.  Patient understands the pain associated with the procedure.  We discussed the risks of tonsillectomy including pain, infection, bleeding, need for reoperation, control of hemorrhage, VPI, tongue numbness, injury to teeth lips and gums    - tonsillectomy scheduled for October 4th, consented today  - return to clinic one-month postop      LIMA Menon MD  [facility] Department of Otolaryngology  [facility]

## 2024-09-25 ENCOUNTER — ANESTHESIA EVENT (OUTPATIENT)
Dept: SURGERY | Facility: HOSPITAL | Age: 24
End: 2024-09-25
Payer: MEDICAID

## 2024-09-25 NOTE — ANESTHESIA PREPROCEDURE EVALUATION
Zuleika Morales is a 24 y.o. female PRESENTING FOR TONSILLECTOMY (Throat) with a history of   -RECURRENT TONSILLITIS  -MORBID OBESITY, BMI 38  -H/O THROMBOCYTOPENIA  -ANXIETY    BETA-BLOCKER: NONE    New Orders for Anesthesia: UPT    Patient Active Problem List   Diagnosis    Benign gestational thrombocytopenia in third trimester    Increased BMI affecting pregnancy in third trimester    BMI at 37 at the initial M consult    Hyperemesis gravidarum, antepartum    Elevated blood pressure affecting pregnancy in third trimester, antepartum    Postnasal drip    Oligohydramnios       Pre-op Assessment    I have reviewed the NPO Status.      Review of Systems  Anesthesia Hx:  No problems with previous Anesthesia                Social:  Non-Smoker       Cardiovascular:  Cardiovascular Normal                                            Pulmonary:  Pulmonary Normal                       Renal/:  Renal/ Normal                 Hepatic/GI:  Hepatic/GI Normal                 Neurological:  Neurology Normal                                      Endocrine:        Morbid Obesity / BMI > 40  Psych:   anxiety               Vitals:    10/04/24 0749 10/04/24 0757 10/04/24 0816 10/04/24 0826   BP: 120/85  120/85 116/86   Pulse: 71   79   Resp:  14  17   Temp: 36.9 °C (98.4 °F)   36.4 °C (97.5 °F)   TempSrc: Oral      SpO2: 98%   99%   Weight:             Physical Exam  General: Alert, Cooperative and Well nourished    Airway:  Mallampati: II   Mouth Opening: Normal  TM Distance: Normal  Tongue: Normal  Neck ROM: Normal ROM    Dental:  Intact, Braces    Chest/Lungs:  Clear to auscultation, Normal Respiratory Rate    Heart:  Rate: Normal  Rhythm: Regular Rhythm  Sounds: Normal       Latest Reference Range & Units 10/04/24 07:57   hCG Qualitative, Urine Negative  Negative     Lab Results   Component Value Date    WBC 10.94 05/09/2024    HGB 13.1 05/09/2024    HCT 39.5 05/09/2024    MCV 90.0 05/09/2024     05/09/2024        CMP  Sodium   Date Value Ref Range Status   05/09/2024 137 136 - 145 mmol/L Final     Potassium   Date Value Ref Range Status   05/09/2024 4.4 3.5 - 5.1 mmol/L Final     Chloride   Date Value Ref Range Status   05/09/2024 106 98 - 107 mmol/L Final     CO2   Date Value Ref Range Status   05/09/2024 25 22 - 29 mmol/L Final     Blood Urea Nitrogen   Date Value Ref Range Status   05/09/2024 6.9 (L) 7.0 - 18.7 mg/dL Final     Creatinine   Date Value Ref Range Status   05/09/2024 0.65 0.55 - 1.02 mg/dL Final     Calcium   Date Value Ref Range Status   05/09/2024 9.7 8.4 - 10.2 mg/dL Final     Albumin   Date Value Ref Range Status   05/09/2024 4.2 3.5 - 5.0 g/dL Final     Bilirubin Total   Date Value Ref Range Status   05/09/2024 0.4 <=1.5 mg/dL Final     ALP   Date Value Ref Range Status   05/09/2024 75 40 - 150 unit/L Final     AST   Date Value Ref Range Status   05/09/2024 25 5 - 34 unit/L Final     ALT   Date Value Ref Range Status   05/09/2024 24 0 - 55 unit/L Final     eGFR   Date Value Ref Range Status   05/09/2024 >60 mL/min/1.73/m2 Final         Anesthesia Plan  Type of Anesthesia, risks & benefits discussed:    Anesthesia Type: Gen ETT  Intra-op Monitoring Plan: Standard ASA Monitors  Post Op Pain Control Plan: IV/PO Opioids PRN  Induction:  IV  Airway Plan: Direct  Informed Consent: Informed consent signed with the Patient and all parties understand the risks and agree with anesthesia plan.  All questions answered.   ASA Score: 2  Day of Surgery Review of History & Physical: H&P Update referred to the surgeon/provider.    Ready For Surgery From Anesthesia Perspective.     .

## 2024-09-25 NOTE — PROGRESS NOTES
I have reviewed and agree with the resident's findings, including all diagnostic interpretations and plans as written.     Patrice Manuel M.D.

## 2024-10-04 ENCOUNTER — ANESTHESIA (OUTPATIENT)
Dept: SURGERY | Facility: HOSPITAL | Age: 24
End: 2024-10-04
Payer: MEDICAID

## 2024-10-04 ENCOUNTER — HOSPITAL ENCOUNTER (OUTPATIENT)
Facility: HOSPITAL | Age: 24
Discharge: HOME | End: 2024-10-04
Attending: OTOLARYNGOLOGY | Admitting: OTOLARYNGOLOGY
Payer: MEDICAID

## 2024-10-04 DIAGNOSIS — J03.91 RECURRENT TONSILLITIS: ICD-10-CM

## 2024-10-04 LAB
B-HCG UR QL: NEGATIVE
CTP QC/QA: YES

## 2024-10-04 PROCEDURE — 25000003 PHARM REV CODE 250: Performed by: NURSE ANESTHETIST, CERTIFIED REGISTERED

## 2024-10-04 PROCEDURE — 63600175 PHARM REV CODE 636 W HCPCS: Performed by: NURSE ANESTHETIST, CERTIFIED REGISTERED

## 2024-10-04 PROCEDURE — 71000015 HC POSTOP RECOV 1ST HR: Performed by: OTOLARYNGOLOGY

## 2024-10-04 PROCEDURE — 63600175 PHARM REV CODE 636 W HCPCS: Performed by: ANESTHESIOLOGY

## 2024-10-04 PROCEDURE — 37000009 HC ANESTHESIA EA ADD 15 MINS: Performed by: OTOLARYNGOLOGY

## 2024-10-04 PROCEDURE — 71000033 HC RECOVERY, INTIAL HOUR: Performed by: OTOLARYNGOLOGY

## 2024-10-04 PROCEDURE — 25000003 PHARM REV CODE 250: Performed by: OTOLARYNGOLOGY

## 2024-10-04 PROCEDURE — 25000003 PHARM REV CODE 250: Performed by: ANESTHESIOLOGY

## 2024-10-04 PROCEDURE — 36000706: Performed by: OTOLARYNGOLOGY

## 2024-10-04 PROCEDURE — 37000008 HC ANESTHESIA 1ST 15 MINUTES: Performed by: OTOLARYNGOLOGY

## 2024-10-04 PROCEDURE — 88304 TISSUE EXAM BY PATHOLOGIST: CPT | Mod: TC | Performed by: OTOLARYNGOLOGY

## 2024-10-04 PROCEDURE — 36000707: Performed by: OTOLARYNGOLOGY

## 2024-10-04 PROCEDURE — 71000016 HC POSTOP RECOV ADDL HR: Performed by: OTOLARYNGOLOGY

## 2024-10-04 PROCEDURE — 81025 URINE PREGNANCY TEST: CPT | Performed by: NURSE PRACTITIONER

## 2024-10-04 RX ORDER — GLYCOPYRROLATE 0.2 MG/ML
INJECTION INTRAMUSCULAR; INTRAVENOUS
Status: DISCONTINUED | OUTPATIENT
Start: 2024-10-04 | End: 2024-10-04

## 2024-10-04 RX ORDER — DEXAMETHASONE 4 MG/1
TABLET ORAL
Qty: 4 TABLET | Refills: 0 | Status: SHIPPED | OUTPATIENT
Start: 2024-10-04

## 2024-10-04 RX ORDER — PROPOFOL 10 MG/ML
VIAL (ML) INTRAVENOUS
Status: DISCONTINUED | OUTPATIENT
Start: 2024-10-04 | End: 2024-10-04

## 2024-10-04 RX ORDER — DEXAMETHASONE SODIUM PHOSPHATE 4 MG/ML
INJECTION, SOLUTION INTRA-ARTICULAR; INTRALESIONAL; INTRAMUSCULAR; INTRAVENOUS; SOFT TISSUE
Status: DISCONTINUED | OUTPATIENT
Start: 2024-10-04 | End: 2024-10-04

## 2024-10-04 RX ORDER — FENTANYL CITRATE 50 UG/ML
INJECTION, SOLUTION INTRAMUSCULAR; INTRAVENOUS
Status: DISCONTINUED | OUTPATIENT
Start: 2024-10-04 | End: 2024-10-04

## 2024-10-04 RX ORDER — OXYMETAZOLINE HCL 0.05 %
SPRAY, NON-AEROSOL (ML) NASAL
Status: DISCONTINUED | OUTPATIENT
Start: 2024-10-04 | End: 2024-10-04 | Stop reason: HOSPADM

## 2024-10-04 RX ORDER — ONDANSETRON HYDROCHLORIDE 2 MG/ML
INJECTION, SOLUTION INTRAVENOUS
Status: DISCONTINUED | OUTPATIENT
Start: 2024-10-04 | End: 2024-10-04

## 2024-10-04 RX ORDER — IBUPROFEN 800 MG/1
800 TABLET ORAL 4 TIMES DAILY
Qty: 28 TABLET | Refills: 0 | Status: SHIPPED | OUTPATIENT
Start: 2024-10-04 | End: 2024-10-11

## 2024-10-04 RX ORDER — GLUCAGON 1 MG
1 KIT INJECTION
Status: DISCONTINUED | OUTPATIENT
Start: 2024-10-04 | End: 2024-10-04 | Stop reason: HOSPADM

## 2024-10-04 RX ORDER — TRAMADOL HYDROCHLORIDE 50 MG/1
50 TABLET ORAL
Status: COMPLETED | OUTPATIENT
Start: 2024-10-04 | End: 2024-10-04

## 2024-10-04 RX ORDER — MIDAZOLAM HYDROCHLORIDE 2 MG/2ML
2 INJECTION, SOLUTION INTRAMUSCULAR; INTRAVENOUS ONCE AS NEEDED
Status: COMPLETED | OUTPATIENT
Start: 2024-10-04 | End: 2024-10-04

## 2024-10-04 RX ORDER — ROCURONIUM BROMIDE 10 MG/ML
INJECTION, SOLUTION INTRAVENOUS
Status: DISCONTINUED | OUTPATIENT
Start: 2024-10-04 | End: 2024-10-04

## 2024-10-04 RX ORDER — OXYCODONE HYDROCHLORIDE 5 MG/1
5 TABLET ORAL EVERY 4 HOURS PRN
Qty: 24 TABLET | Refills: 0 | Status: SHIPPED | OUTPATIENT
Start: 2024-10-04

## 2024-10-04 RX ORDER — ACETAMINOPHEN 500 MG
1000 TABLET ORAL
Status: COMPLETED | OUTPATIENT
Start: 2024-10-04 | End: 2024-10-04

## 2024-10-04 RX ORDER — SODIUM CHLORIDE, SODIUM LACTATE, POTASSIUM CHLORIDE, CALCIUM CHLORIDE 600; 310; 30; 20 MG/100ML; MG/100ML; MG/100ML; MG/100ML
INJECTION, SOLUTION INTRAVENOUS CONTINUOUS
Status: DISCONTINUED | OUTPATIENT
Start: 2024-10-04 | End: 2024-10-04 | Stop reason: HOSPADM

## 2024-10-04 RX ORDER — DEXMEDETOMIDINE HYDROCHLORIDE 100 UG/ML
INJECTION, SOLUTION INTRAVENOUS
Status: DISCONTINUED | OUTPATIENT
Start: 2024-10-04 | End: 2024-10-04

## 2024-10-04 RX ORDER — SODIUM CHLORIDE 9 MG/ML
INJECTION, SOLUTION INTRAVENOUS CONTINUOUS
Status: DISCONTINUED | OUTPATIENT
Start: 2024-10-04 | End: 2024-10-04 | Stop reason: HOSPADM

## 2024-10-04 RX ORDER — MEPERIDINE HYDROCHLORIDE 25 MG/ML
12.5 INJECTION INTRAMUSCULAR; INTRAVENOUS; SUBCUTANEOUS EVERY 10 MIN PRN
Status: DISCONTINUED | OUTPATIENT
Start: 2024-10-04 | End: 2024-10-04 | Stop reason: HOSPADM

## 2024-10-04 RX ORDER — SODIUM CHLORIDE 0.9 % (FLUSH) 0.9 %
10 SYRINGE (ML) INJECTION
Status: DISCONTINUED | OUTPATIENT
Start: 2024-10-04 | End: 2024-10-04 | Stop reason: HOSPADM

## 2024-10-04 RX ORDER — ACETAMINOPHEN 325 MG/1
650 TABLET ORAL EVERY 6 HOURS
Qty: 60 TABLET | Refills: 0 | Status: SHIPPED | OUTPATIENT
Start: 2024-10-04 | End: 2024-10-11

## 2024-10-04 RX ORDER — LIDOCAINE HYDROCHLORIDE 20 MG/ML
INJECTION INTRAVENOUS
Status: DISCONTINUED | OUTPATIENT
Start: 2024-10-04 | End: 2024-10-04

## 2024-10-04 RX ORDER — OXYCODONE HYDROCHLORIDE 5 MG/1
5 TABLET ORAL
Status: DISCONTINUED | OUTPATIENT
Start: 2024-10-04 | End: 2024-10-04 | Stop reason: HOSPADM

## 2024-10-04 RX ORDER — ONDANSETRON HYDROCHLORIDE 2 MG/ML
4 INJECTION, SOLUTION INTRAVENOUS DAILY PRN
Status: DISCONTINUED | OUTPATIENT
Start: 2024-10-04 | End: 2024-10-04 | Stop reason: HOSPADM

## 2024-10-04 RX ORDER — GABAPENTIN 300 MG/1
600 CAPSULE ORAL
Status: COMPLETED | OUTPATIENT
Start: 2024-10-04 | End: 2024-10-04

## 2024-10-04 RX ORDER — HYDROMORPHONE HYDROCHLORIDE 1 MG/ML
0.2 INJECTION, SOLUTION INTRAMUSCULAR; INTRAVENOUS; SUBCUTANEOUS EVERY 5 MIN PRN
Status: DISCONTINUED | OUTPATIENT
Start: 2024-10-04 | End: 2024-10-04 | Stop reason: HOSPADM

## 2024-10-04 RX ADMIN — SODIUM CHLORIDE, POTASSIUM CHLORIDE, SODIUM LACTATE AND CALCIUM CHLORIDE: 600; 310; 30; 20 INJECTION, SOLUTION INTRAVENOUS at 08:10

## 2024-10-04 RX ADMIN — DEXMEDETOMIDINE 20 MCG: 200 INJECTION, SOLUTION INTRAVENOUS at 09:10

## 2024-10-04 RX ADMIN — FENTANYL CITRATE 100 MCG: 50 INJECTION INTRAMUSCULAR; INTRAVENOUS at 09:10

## 2024-10-04 RX ADMIN — PROPOFOL 200 MG: 10 INJECTION, EMULSION INTRAVENOUS at 09:10

## 2024-10-04 RX ADMIN — GLYCOPYRROLATE 0.2 MG: 0.2 INJECTION INTRAMUSCULAR; INTRAVENOUS at 09:10

## 2024-10-04 RX ADMIN — ACETAMINOPHEN 1000 MG: 500 TABLET, FILM COATED ORAL at 07:10

## 2024-10-04 RX ADMIN — TRAMADOL HYDROCHLORIDE 50 MG: 50 TABLET, COATED ORAL at 07:10

## 2024-10-04 RX ADMIN — OXYCODONE HYDROCHLORIDE 5 MG: 5 TABLET ORAL at 11:10

## 2024-10-04 RX ADMIN — SODIUM CHLORIDE, POTASSIUM CHLORIDE, SODIUM LACTATE AND CALCIUM CHLORIDE: 600; 310; 30; 20 INJECTION, SOLUTION INTRAVENOUS at 09:10

## 2024-10-04 RX ADMIN — ROCURONIUM BROMIDE 40 MG: 10 INJECTION INTRAVENOUS at 09:10

## 2024-10-04 RX ADMIN — LIDOCAINE HYDROCHLORIDE 50 MG: 20 INJECTION INTRAVENOUS at 09:10

## 2024-10-04 RX ADMIN — DEXAMETHASONE SODIUM PHOSPHATE 12 MG: 4 INJECTION, SOLUTION INTRA-ARTICULAR; INTRALESIONAL; INTRAMUSCULAR; INTRAVENOUS; SOFT TISSUE at 09:10

## 2024-10-04 RX ADMIN — ONDANSETRON 4 MG: 2 INJECTION INTRAMUSCULAR; INTRAVENOUS at 09:10

## 2024-10-04 RX ADMIN — GABAPENTIN 600 MG: 300 CAPSULE ORAL at 07:10

## 2024-10-04 RX ADMIN — SUGAMMADEX 200 MG: 100 INJECTION, SOLUTION INTRAVENOUS at 10:10

## 2024-10-04 RX ADMIN — MIDAZOLAM HYDROCHLORIDE 2 MG: 1 INJECTION, SOLUTION INTRAMUSCULAR; INTRAVENOUS at 08:10

## 2024-10-04 NOTE — OP NOTE
[facility] OTOLARYNGOLOGY OPERATIVE REPORT    Patient Name: Zuleika Morales  Date of Admission: 10/4/2024  YOB: 2000  Date of procedure: 10/04/2024    Attending Surgeon: MD Mar    Resident Surgeon(s):   Concetta Lynn MD, PGY-3    Pre-operative diagnosis:  1. Recurrent tonsillitis      Post-operative diagnosis:   1. same    Procedure:  1. Tonsillectomy bilateral    Anesthesia: general    Blood Loss: 10cc    Implants: None    Drains: None    Specimens:   ID Type Source Tests Collected by Time Destination   A :  Tissue Tonsil, Left SPECIMEN TO PATHOLOGY Patrice Manuel MD 10/4/2024 0924    B :  Tissue Tonsil, Right SPECIMEN TO PATHOLOGY Patrice Manuel MD 10/4/2024 0924        Complications: None     Findings: 3+ tonsils    Indication:  Zuleika Morales is a 24 y.o. female with recurrent tonsillitis. All treatment options were discussed, including observation, medical management, and surgical intervention. Ultimately, the joint decision was made to proceed with surgery. Informed consent was obtained from patient. All risks, benefits, and alternatives were reviewed, and all questions were answered.     Procedure in detail:    Patient was met in preoperative area. R/B/A were discussed with the patient. Patient was brought back to the operative suite. GETA was administered by the anesthesia team. Timeout was performed verifying patient's name, procedure, laterality and allergies. Patient was draped in usaul fashion. Missy-adela mouth gag was inserted and oral cavity was exposed. Patient was placed in suspension. Palate was examined for submucous cleft and bifid uvula and was noted to not have either.  3+ tonsils were noted bilaterally. Red rubber catheter was inserted in through the nare and brought out through the mouth and clamped with a tonsillar clamp. Mirror was utulized to examine the adenoid pad and note to have no hypertrophy.  Allis clamp was used to grasp the superior pole of the right  tonsil. Monopolar cautery was used to dissect the tonsil from the surrounding stroma and musculature. Right tonsil was passed off to the back table for pathology. Attention was then turned to the left tonsil. Allis clamp was used to grasp the superior pole. Monopolar cautery was used to dissect the tonsil from the surrounding stroma and musculature. Left tonsil was passed off to the back table for pathology. Red rubber catheter was removed. Tonsillar fossas were irrigated out and challenged mechanically using metal baby Yankauer suction. Hemostasis was achieved in bilateral fossas using suction bovie cautery. Suspension was then released momentarily and resuspended for one final hemostasis check. Missy-adela mouth gag was then released from suspension and removed in atraumatic fashion.    Patient was then undraped, cleaned off, and turned over to anesthesia for emergence. Patient tolerated procedure well, was extubated without issue and transferred to PACU in stable condition.     Concetta Lynn MD  [facility], PGY-3  10/4/2024 12:31 PM

## 2024-10-04 NOTE — TRANSFER OF CARE
Anesthesia Transfer of Care Note    Patient: Zuleika Morales    Procedure(s) Performed: Procedure(s) (LRB):  TONSILLECTOMY (N/A)    Patient location: PACU    Anesthesia Type: general    Transport from OR: Transported from OR on room air with adequate spontaneous ventilation    Post pain: adequate analgesia    Post assessment: no apparent anesthetic complications    Post vital signs: stable    Level of consciousness: sedated    Nausea/Vomiting: no nausea/vomiting    Complications: none    Transfer of care protocol was followed      Last vitals:

## 2024-10-04 NOTE — BRIEF OP NOTE
[facility] - Periop Services  Brief Operative Note    Surgery Date: 10/4/2024     Surgeons and Role:     * Patrice Manuel MD - Primary     * Ken Street MD - Resident - Assisting  Concetta Lynn MD - Resident      Pre-op Diagnosis:  Recurrent tonsillitis    Post-op Diagnosis:  Post-Op Diagnosis Codes:     * Recurrent tonsillitis [J03.91]    Procedure(s) (LRB):  TONSILLECTOMY (N/A)    Anesthesia: General    Operative Findings: 3+ exophytic tonsils.     Estimated Blood Loss: 10mL         Specimens:   Specimen (24h ago, onward)      None              Discharge Note    OUTCOME: Patient tolerated treatment/procedure well without complication and is now ready for discharge.    DISPOSITION: Home or Self Care    FINAL DIAGNOSIS:  Chronic tonsillitis    FOLLOWUP: In clinic    Ken Street MD  [facility] PGY-5  10/04/2024 10:07 AM

## 2024-10-04 NOTE — ANESTHESIA PROCEDURE NOTES
Intubation    Date/Time: 10/4/2024 9:14 AM    Performed by: Mitzy Tejeda CRNA  Authorized by: Lucrecia Jackson MD    Intubation:     Induction:  Intravenous    Intubated:  Postinduction    Mask Ventilation:  Easy mask    Attempts:  1    Attempted By:  CRNA    Method of Intubation:  Direct    Blade:  Rodriguez 2    Laryngeal View Grade: Grade I - full view of cords      Difficult Airway Encountered?: No      Complications:  None    Airway Device:  Oral endotracheal tube    Airway Device Size:  7.5    Style/Cuff Inflation:  Cuffed (inflated to minimal occlusive pressure)    Inflation Amount (mL):  6    Tube secured:  22    Secured at:  The lips    Placement Verified By:  Capnometry    Complicating Factors:  Oropharyngeal edema or fat    Findings Post-Intubation:  BS equal bilateral and atraumatic/condition of teeth unchanged

## 2024-10-04 NOTE — DISCHARGE INSTRUCTIONS
Tonsillectomy & Adenoidectomy Post-Operative Instructions    Activity: No heavy lifting, straining, or bending for 2 weeks. No heavy exercise for two weeks. You may shower after your surgery.    Diet: After surgery, the most important thing is to drink as many fluids as you can to stay hydrated. Water, sports drinks, and juice are all good options. We would like for you to stay on a soft diet for two weeks. This includes yogurt, jello, smoothies, protein shakes, mashed potatoes, mac & cheese, pasta, and rice. Please avoid hard, crunchy foods as these may irritate the back of the throat. Some things to avoid include cookies, crackers, chips, bread, and fried chicken. You may also want to avoid acidic or spicy foods, as these could irritate the back of the throat.     Medications: After surgery, it will be important to stay ahead of your pain. There are a few different type of medications to help you do this. First, we recommend taking Tylenol 650mg and Ibuprofen (Motrin, Advil) 600mg on a scheduled basis. You can take these every 6 hours and can alternate them so that you are getting something for pain every 3 hours. An example schedule might look like this:     7:00am - Ibuprofen  10:00am - Tylenol  1:00pm - Ibuprofen  4:00pm - Tylenol  7:00pm - Ibuprofen  10:00pm - Tylenol    We have prescribed some additional stronger pain medications for you after this surgery in case you need them. Please take these as described. If you were prescribed narcotics (e.g. Norco, oxycodone), then do not take these while driving or operating heavy machinery. Please also note that some medications like Norco have Tylenol already in it, so do not take Tylenol and Norco at the same time. Do not take more than 4,000mg of Tylenol in a single 24 hour period.    Finally, we have prescribed a dose of steroids in case you need it to take on the 3rd or 4th day after surgery. These are usually the most painful days. You may take one dose/tablet  on the 3rd day, if you need it. If you are still in pain, you can take the second dose 24 hours later.    What To Expect: Your pain may last for up to 2 weeks. After the surgery, you will have scabs in the back of your throat that will look white, yellow, or gray. Do not be alarmed, as this is normal and not a sign of infection. It is normal to have neck pain or even ear pain, as the pain from the throat can be felt in both of these areas. You may also have bad breath as the scabs fall off in a couple of days.     What to Watch For: The major thing to watch for is bleeding after the surgery. This is uncommon, but does sometimes happen. If you notice bleeding from your throat that is more than a tablespoon or does not stop, then please call the office or the on-call physician (see the numbers below). Sometimes, this is something we can manage over the phone. Sometimes, this would require you to come into the hospital. If you are unsure, you can always call. If there is uncontrolled bleeding that does not stop, please go to the nearest emergency room.     Follow Up: Please follow up with the ENT Team in approximately 4 weeks. Your appointment will be at [facility] ([address] [place] [address], [place] [address]). The ENT Clinic will call you with an appointment date and time. If you have any questions or concerns in the meantime, you may call the clinic at [phone]. If you have any questions or concerns after hours, call [phone] and you will be connected to an on-call ENT physician.

## 2024-10-04 NOTE — ANESTHESIA POSTPROCEDURE EVALUATION
Anesthesia Post Evaluation    Patient: Zuleika Morales    Procedure(s) Performed: Procedure(s) (LRB):  TONSILLECTOMY (N/A)    Final Anesthesia Type: general      Patient location during evaluation: DOSC  Post-procedure vital signs: reviewed and stable  Airway patency: patent      Anesthetic complications: no      Cardiovascular status: hemodynamically stable  Respiratory status: spontaneous ventilation  Follow-up not needed.              Vitals Value Taken Time   /95 10/04/24 1229   Temp 35.9 °C (96.6 °F) 10/04/24 1056   Pulse 61 10/04/24 1227   Resp 22 10/04/24 1133   SpO2 97 % 10/04/24 1227   Vitals shown include unfiled device data.      Event Time   Out of Recovery 10:53:00         Pain/Junaid Score: Pain Rating Prior to Med Admin: 10 (10/4/2024 11:33 AM)  Junaid Score: 9 (10/4/2024 10:56 AM)

## 2024-10-07 VITALS
WEIGHT: 206 LBS | HEART RATE: 61 BPM | TEMPERATURE: 97 F | RESPIRATION RATE: 22 BRPM | OXYGEN SATURATION: 97 % | BODY MASS INDEX: 36.49 KG/M2 | SYSTOLIC BLOOD PRESSURE: 124 MMHG | DIASTOLIC BLOOD PRESSURE: 95 MMHG

## 2024-10-07 PROBLEM — J03.91 RECURRENT TONSILLITIS: Status: ACTIVE | Noted: 2024-10-07

## 2024-10-07 LAB
ESTROGEN SERPL-MCNC: NORMAL PG/ML
INSULIN SERPL-ACNC: NORMAL U[IU]/ML
LAB AP CLINICAL INFORMATION: NORMAL
LAB AP GROSS DESCRIPTION: NORMAL
LAB AP REPORT FOOTNOTES: NORMAL
T3RU NFR SERPL: NORMAL %

## 2024-10-17 ENCOUNTER — OFFICE VISIT (OUTPATIENT)
Dept: OTOLARYNGOLOGY | Facility: CLINIC | Age: 24
End: 2024-10-17
Payer: MEDICAID

## 2024-10-17 VITALS
RESPIRATION RATE: 20 BRPM | BODY MASS INDEX: 36.5 KG/M2 | SYSTOLIC BLOOD PRESSURE: 124 MMHG | TEMPERATURE: 98 F | WEIGHT: 206 LBS | HEIGHT: 63 IN | HEART RATE: 100 BPM | OXYGEN SATURATION: 99 % | DIASTOLIC BLOOD PRESSURE: 78 MMHG

## 2024-10-17 DIAGNOSIS — Z90.89 S/P TONSILLECTOMY: ICD-10-CM

## 2024-10-17 DIAGNOSIS — J03.91 RECURRENT TONSILLITIS: Primary | ICD-10-CM

## 2024-10-17 PROCEDURE — 99213 OFFICE O/P EST LOW 20 MIN: CPT | Mod: PBBFAC

## 2024-10-17 NOTE — LETTER
October 17, 2024      [facility], Entrance [address]  [address] [facility] [address]  Phone: [phone]       Patient: Zuleika Morales   YOB: 2000  Date of Visit: 10/17/2024    To Whom It May Concern:    Judy Morales  was at [facility] on 10/17/2024. The patient may return to work on 10/23/2024 with no restrictions. If you have any questions or concerns, or if I can be of further assistance, please do not hesitate to contact me.    Sincerely,    Ciara Dumont MA

## 2024-10-17 NOTE — PROGRESS NOTES
[facility]  Otolaryngology Clinic Note    Zuleika Morales  Encounter Date: 10/17/2024  YOB: 2000  Physician: MD Lynsey    Chief Complaint:  Tonsillitis    HPI: Zuleika Morales is a 24 y.o. female with no past medical history who presents with recurrent tonsillitis.  Every year 3-4 times patient is diagnosed with strep tonsillitis requiring antibiotics.  She also misses 1 week of work because of her tonsillitis every 3 or 4 months.  She has been getting tonsil infections since she was 3 years old.  She reports that when she gets tonsillitis she is unable to sleep due to trouble breathing.  Patient also reports that she has nighttime snoring.  When she gets tonsillitis she has dysphagia and odynophagia.  Has not had any previous ear nose and throat surgeries  Not on any blood thinners.  She was pregnant in 1 year ago, not currently pregnant    10/17/24: Pt presents today for follow up. She has been doing well. She underwent tonsillectomy on 10/4/24. She has been tolerating soft food. She has been taking tylenol and ibuprofen as well as the steroids and some oxy for her pain. She states this week the pain has been much better. She states she had some minimal bleeding that stopped on its own last week.     ROS:   General: Negative except per HPI  Skin: Denies rash, ulcer, or lesion.  Eyes: Denies vision changes or diplopia.  Ears: Negative except per HPI  Nose: Negative except per HPI  Throat/mouth: Negative except per HPI  Cardiovascular: Negative except per HPI  Respiratory: Negative except per HPI  Neck: Negative except per HPI  Endocrine: Negative except per HPI  Neurologic: Negative except per HPI    Other 10-point review of systems negative except per HPI      Review of patient's allergies indicates:  No Known Allergies    Past Medical History:   Diagnosis Date    Thrombocytopenia, unspecified 09/2023       Past Surgical History:   Procedure Laterality Date    DILATION AND  CURETTAGE OF UTERUS  2018    TONSILLECTOMY N/A 10/4/2024    Procedure: TONSILLECTOMY;  Surgeon: Patrice Manuel MD;  Location: [facility];  Service: ENT;  Laterality: N/A;       Social History     Socioeconomic History    Marital status:    Tobacco Use    Smoking status: Never    Smokeless tobacco: Never   Substance and Sexual Activity    Alcohol use: Not Currently    Drug use: Never    Sexual activity: Yes     Partners: Male     Social Drivers of Health     Financial Resource Strain: Low Risk  (3/22/2023)    [facility] [facility], [employer] [facility] Subsidiaries and Affiliates    Overall Financial Resource Strain (CARDIA)     Difficulty of Paying Living Expenses: Not hard at all   Food Insecurity: Unknown (3/22/2023)    [facility] [place] [facility] and Affiliates, [employer] [facility] Subsidiaries and Affiliates    Hunger Vital Sign     Worried About Running Out of Food in the Last Year: Never true   Transportation Needs: Unknown (3/22/2023)    [place] from [place] and [facility], [place] [employer] [facility] Subsidiaries and Affiliates    PRAPARE - Transportation     Lack of Transportation (Medical): No   Physical Activity: Unknown (3/22/2023)    Received from [place] Sub[facility] and Affiliates, [employer] [facility] Its Subsidiaries and Affiliates    Exercise Vital Sign     Days of Exercise per Week: 5 days   Stress: No Stress Concern Present (3/22/2023)    [facility] and [facility], [employer] [facility]  "and Affiliates    [facility] [place] of Occupational Health - Occupational Stress Questionnaire     Feeling of Stress : Not at all   Housing Stability: Unknown (3/22/2023)    [facility] and Affili[facility], [facility] Its Subsidiaries and Affiliates    Housing Stability Vital Sign     Unable to Pay for Housing in the Last Year: No     Unstable Housing in the Last Year: No       Family History   Problem Relation Name Age of Onset    Miscarriages / Stillbirths Maternal Grandmother      Hypertension Maternal Grandmother      Diabetes Maternal Grandmother      Hypertension Maternal Grandfather      Diabetes Maternal Grandfather      Seizures Brother         Outpatient Encounter Medications as of 10/17/2024   Medication Sig Dispense Refill    [] acetaminophen (TYLENOL) 325 MG tablet Take 2 tablets (650 mg total) by mouth every 6 (six) hours. for 7 days 60 tablet 0    dexAMETHasone (DECADRON) 4 MG Tab Take two tablets on the third day after surgery (Monday, 10/7). You may take another two tablets 24 hours later if you are still having pain. (Patient not taking: Reported on 10/17/2024) 4 tablet 0    [] ibuprofen (ADVIL,MOTRIN) 800 MG tablet Take 1 tablet (800 mg total) by mouth 4 (four) times daily. for 7 days 28 tablet 0    oxyCODONE (ROXICODONE) 5 MG immediate release tablet Take 1 tablet (5 mg total) by mouth every 4 (four) hours as needed for Pain. (Patient not taking: Reported on 10/17/2024) 24 tablet 0     No facility-administered encounter medications on file as of 10/17/2024.       Physical Exam:  Vitals:    10/17/24 1330   BP: 124/78   BP Location: Right arm   Patient Position: Sitting   Pulse: 100   Resp: 20   Temp: 97.9 °F (36.6 °C)   TempSrc: Oral   SpO2: 99%   Weight: 93.4 kg (206 lb)   Height: 5' 3" (1.6 m)       Constitutional  General Appearance: well nourished, well-developed, AAO " x3, NAD  HEENT  Eyes: PEERLA, EOMI, normal conjunctivae  Ears: Hearing well at conversation level  Nose: septum midline, no inferior turbinate hypertrophy, no polyps, moist mucosa without erythema or blue hue  OC/OP: dentition moderate, no oral lesions, tongue/FOM/BOT- soft, no leukoplakia/ulcerations/ tenderness, tonsils surgically absent and fossae with appropriate eschar  Neck: soft, non-tender, no palpable lymph nodes   Thyroid region- no nodules or goiter  Neuro: CN II - XII intact bilaterally grossly  Respiratory: non-labored respirations  Psychiatric: oriented to time, place and person, no depression, anxiety or agitation      Assessment/Plan:  Zuleika Morales is a 24 y.o. female with a recurrent strep tonsillitis now s/p tonsillectomy on 10/4/24.    -Pt is doing well and healing well in the postop period. RTC PRN    Concetta Lynn MD  [facility] Department of Otolaryngology  -III

## (undated) DEVICE — MANIFOLD 4 PORT

## (undated) DEVICE — SOL NACL IRR 1000ML BTL

## (undated) DEVICE — KIT SURGICAL TURNOVER

## (undated) DEVICE — ELECTRODE PATIENT RETURN DISP

## (undated) DEVICE — KIT ANTIFOG W/SPONG & FLUID

## (undated) DEVICE — GLOVE SENSICARE PI GRN 6.5

## (undated) DEVICE — Device

## (undated) DEVICE — CATH ALL PUR URTHL RR 10FR

## (undated) DEVICE — ELECTRODE BLADE INSULATED 1 IN

## (undated) DEVICE — SUCTION COAGULATOR 10FR 6IN